# Patient Record
Sex: FEMALE | Race: WHITE | NOT HISPANIC OR LATINO
[De-identification: names, ages, dates, MRNs, and addresses within clinical notes are randomized per-mention and may not be internally consistent; named-entity substitution may affect disease eponyms.]

---

## 2019-02-11 PROBLEM — Z00.00 ENCOUNTER FOR PREVENTIVE HEALTH EXAMINATION: Status: ACTIVE | Noted: 2019-02-11

## 2019-02-22 ENCOUNTER — APPOINTMENT (OUTPATIENT)
Dept: ORTHOPEDIC SURGERY | Facility: CLINIC | Age: 74
End: 2019-02-22
Payer: MEDICARE

## 2019-02-22 VITALS
BODY MASS INDEX: 34.95 KG/M2 | HEART RATE: 82 BPM | WEIGHT: 162 LBS | SYSTOLIC BLOOD PRESSURE: 125 MMHG | HEIGHT: 57 IN | DIASTOLIC BLOOD PRESSURE: 84 MMHG

## 2019-02-22 DIAGNOSIS — Z78.9 OTHER SPECIFIED HEALTH STATUS: ICD-10-CM

## 2019-02-22 DIAGNOSIS — S83.231A COMPLEX TEAR OF MEDIAL MENISCUS, CURRENT INJURY, RIGHT KNEE, INITIAL ENCOUNTER: ICD-10-CM

## 2019-02-22 DIAGNOSIS — M17.0 BILATERAL PRIMARY OSTEOARTHRITIS OF KNEE: ICD-10-CM

## 2019-02-22 PROCEDURE — 99204 OFFICE O/P NEW MOD 45 MIN: CPT

## 2019-02-22 PROCEDURE — 73564 X-RAY EXAM KNEE 4 OR MORE: CPT | Mod: 50

## 2019-02-22 RX ORDER — CYCLOSPORINE 0.5 MG/ML
0.05 EMULSION OPHTHALMIC
Qty: 60 | Refills: 0 | Status: ACTIVE | COMMUNITY
Start: 2019-02-08

## 2019-02-22 RX ORDER — DICLOFENAC SODIUM 20 MG/G
2 SOLUTION TOPICAL
Qty: 1 | Refills: 0 | Status: ACTIVE | COMMUNITY
Start: 2019-02-22 | End: 1900-01-01

## 2019-02-22 NOTE — PHYSICAL EXAM
[de-identified] : Physical Examination\par General: well nourished, in no acute distress, alert and oriented to person, place and time\par Psychiatric: normal mood and affect, no abnormal movements or speech patterns\par Eyes: vision intact - glasses\par Throat: no thyromegaly\par Lymph: no enlarged nodes, no lymphedema in extremity\par Respiratory: no wheezing, no shortness of breath with ambulation\par Cardiac: no cardiac leg swelling, 2+ peripheral pulses\par Neurology: normal gross sensation in extremities to light touch\par Abdomen: soft, non-tender, tympanic, no masses\par \par Musculoskeletal Examination\par Ambulation	+ antalgic gait, - assistive devices\par \par Knee			Right			Left\par General\par      Swelling/Deformity	normal			normal	\par      Skin			normal			normal\par      Erythema		-			-\par      Standing Alignment	varus			varus\par      Effusion		trace			trace\par Range of Motion\par      Hip			full painless ROM		full painless ROM\par      Knee Flexion		100			100\par      Knee Extension	0			0\par Patella\par      J Sign		-			-\par      Quad Medial/Lateral	1/1 1/1\par      Apprehension		-			-\par      Kavon's		+			+\par      Grind Sign		+			+\par      Crepitus		+			+\par Palpation\par      Medial Joint Line	+			+\par      Medial Fem Condyle	-			-\par      Lateral Joint Line	-			-\par      Quad Tendon		-			-\par      Patella Tendon	-			-\par      Medial Patella		-			-\par      Lateral Patella 	-			-\par      Posterior Knee	+			+\par Ligamentous\par      Varus @ 0° / 30°	-/-			-/-\par      Valgus @ 0° / 30°	-/-			-/-\par      Lachman		-			-\par      Pivot Shift		-			-\par      Anterior Drawer	-			-\par      Posterior Drawer	-			-\par Meniscus\par      Colton		=			=\par      Flexion Pinch		-			-\par Strength Examination/Atrophy\par      Hip Flexors 		5+			5+\par      Quadriceps		5+			5+\par      Hamstring		5+			5+\par      Tibialis Anterior	5+			5+\par      Achilles/Soleus	5+			5+\par Sensation\par      Deep Peroneal	normal			normal\par      Superficial Peroneal 	normal			normal\par      Sural  		normal			normal\par      Posterior Tibial 	normal			normal\par      Saphneous 		normal			normal\par Pulses\par      DP			2+			2+\par  [de-identified] : 5 views of the affected bilateral knee (standing AP, flexing standing AP, 30degree flexed lateral, 0degree lateral, sunrise view)\par were ordered, obtained and evaluated by myself today and\par demonstrate:\par \par RIGHT\par There is severe flexed knee asymmetric medial narrowing\par Small osteophytic lipping\par Trace suprapatellar effusion\par I'll to moderate patellofemoral joint space loss without evidence of tilt [or] subluxation on sunrise view\par Normal soft tissue density\par Otherwise normal osseous bone structure without fracture or dislocation\par \par LEFT\par There is severe flexed knee asymmetric medial narrowing\par Small osteophytic lipping\par Trace suprapatellar effusion\par I'll to moderate patellofemoral joint space loss without evidence of tilt [or] subluxation on sunrise view\par Normal soft tissue density\par Otherwise normal osseous bone structure without fracture or dislocation\par \par \par \par MRI of the right knee [facility] dated 11-5-18\par \par My impression of the images:\par Quality of the MRI is ok\par Medial Meniscus complex posterior horn tear with extrusion of the body\par Lateral Meniscus ok\par There is severe chondral loss in the medial compartments\par There is bone marrow edema in the medial compartments\par LCL is intact\par MCL is intact\par ACL is intact\par PCL is intact \par Quadriceps Tendon is intact\par Patella Tendon is intact\par \par The Final Radiologist Impression:\par Ligaments and tendons: The anterior and posterior cruciate ligaments are intact.\par The medial and fibular collateral ligaments are unremarkable.\par The posteromedial and posterolateral corner structures are unremarkable.\par \par Extensor mechanism: Mild distal quadriceps tendinosis and mild insertional patellar tendinosis. The extensor retinacula are unremarkable.\par \par Medial compartment: There is complex tearing of the medial meniscus, with large radial tear at the free margin of the posterior horn and body segments, as well as oblique tearing at the posterior horn/body junction. The remnant body segment is extruded into the medial gutter. \par Diffuse full-thickness cartilage loss along the femorotibial surfaces is associated with large medial osteophytes, as well as subchondral cystic change and bone remodeling towards the medial joint margin.\par \par Lateral compartment: The lateral meniscus is intact. Mild cartilage wear along the lateral femoral condylar surfaces. Small marginal osteophytes.\par \par Patellofemoral compartment: Broad full-thickness cartilage loss along the lateral trochlear surfaces. 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 The tibial tubercle-trochlear groove distance measures approximately 11 mm.\par \par Bone: No fracture or osteonecrosis.\par \par Joint space: Moderate effusion/synovitis. Thin superior and lateral plicae. Small oval 7 x 6 x 3 mm signal hypointensity within the medial aspect of the suprapatellar recess, either an intra-articular body or prominent synovial fold. Minimal popliteal cyst.\par Small, complex cyst tracking along the anteromedial aspect of the medial tibial plateau measures approximately 2.4 x 2.1 x 0.7 cm (craniocaudad, transverse, AP dimensions); it is unclear whether this is reflective of a ganglion cyst or parameniscal cyst related to the above-described medial meniscal tearing.\par \par Neurovascular structures: Unremarkable.\par \par Musculature: Unremarkable.\par \par Subcutaneous tissues: Unremarkable.\par \par IMPRESSION: MRI of the right knee demonstrates:\par \par 1. Complex tearing of the medial meniscus. Complex cyst along the anteromedial aspect of the medial tibial plateau, either a parameniscal cyst or ganglion cyst.\par 2. Tricompartmental arthrosis, worst (severe) in the medial compartment with extensive full-thickness cartilage loss.\par 3. High-grade cartilage loss in the patellofemoral compartment. Mild cartilage wear along the lateral femoral condyle.\par 4. Patella madiha, trochlear dysplasia, and additional findings that can be seen with patellar maltracking.\par 5. Moderate joint effusion/synovitis. Small intra-articular body versus prominent synovial fold within the medial suprapatellar recess.\par 6. Mild distal quadriceps tendinosis and mild insertional patellar tendinosis.\par \par

## 2019-02-22 NOTE — DISCUSSION/SUMMARY
[de-identified] : Bilateral knee osteoarthritis\par Right knee medial meniscus tear on MRI\par \par \par The patient and I discussed the causes and progression of degenerative joint disease of the knee. Models, diagrams and drawings were used in the discussion. Treatment can include conservative non-operative management and surgical options. Conservative management includes weight loss, activity modification, physical therapy to improve motion and strength in the muscles around the knee and the body's core, PO and topical NSAIDs, corticosteroid and/or viscosupplementation intra-articular injections. If the patient fails to improve with non-operative management, surgical management is possible. Depending upon the patient's age, BMI, activity level, degree and location of arthrosis different surgical options are possible including arthroscopic debridement with chondroplasty, high-tibial osteotomy, unicondylar/partial arthroplasty, and total joint arthroplasty.\par Physical therapy was prescribed for knee ROM exercises, strengthening exercises, deep tissue massage, core strengthening, hip abductor strengthening, VMO strengthening, modalities PRN, and home exercises\par \par The patient was prescribed Diclofenac topical liquid/creme non-steroidal anti-inflammatory medication. 1-2 pumps twice daily and apply to area with pain. There is low systemic absorption of the medication but risks while reduced remain were discussed and include but not limited to renal damage and GI ulceration and bleeding. They were warned to stop the medication if worsening buring skin or gastric pain or dizziness or other side effects. Also to immediately stop the medication and seek appriate medical attention if any severe stomach ache, gastritis, black/red vomit, black/red stools or any other medical concern.\par \par Injection was discussed with the patient who declined\par \par Patient is not interested in surgical management at this time we'll continue with nonoperative modalities or treatment only\par \par The patient verifies their understanding the the visit, diagnosis and plan. They agree with the treatment plan and will contact the office with any questions or problems.\par Follow up\par PRN

## 2019-02-22 NOTE — HISTORY OF PRESENT ILLNESS
[de-identified] : CC Bilateral knee\par \par HPI 75 yo female right HD presents with [chronic] onset of long period of constant pain in the entire Bilateral knee [without injury]. The pain is [worse], and rated a 8 out of 10, described as pain, [without radiation]. [Rest] makes the pain better and [walking standing stairs] makes the pain worse. The patient reports associated symptoms of none. The patient - pain at night affecting sleep, and + similar pain previously.\par \par The patient has tried the following treatments:\par Activity modification	-\par Ice/Compression  	-\par Braces    		-\par Nsaids    		-\par Physical Therapy 	-\par Cortisone Injection	-\par Visco Injection		-\par Arthroscopy		-\par \par Review of Systems is positive for the above musculoskeletal symptoms and is otherwise non-contributory for general, constitutional, psychiatric, neurologic, HEENT, cardiac, respiratory, gastrointestinal, reproductive, lymphatic, and dermatologic complaints.\par \par Consult by Dr Ekaterina Junior\par \par

## 2019-07-15 ENCOUNTER — APPOINTMENT (OUTPATIENT)
Dept: UROGYNECOLOGY | Facility: CLINIC | Age: 74
End: 2019-07-15
Payer: MEDICARE

## 2019-07-15 VITALS
SYSTOLIC BLOOD PRESSURE: 131 MMHG | HEIGHT: 60 IN | HEART RATE: 65 BPM | DIASTOLIC BLOOD PRESSURE: 87 MMHG | BODY MASS INDEX: 32.39 KG/M2 | WEIGHT: 165 LBS

## 2019-07-15 DIAGNOSIS — R35.0 FREQUENCY OF MICTURITION: ICD-10-CM

## 2019-07-15 DIAGNOSIS — N39.3 STRESS INCONTINENCE (FEMALE) (MALE): ICD-10-CM

## 2019-07-15 DIAGNOSIS — R32 UNSPECIFIED URINARY INCONTINENCE: ICD-10-CM

## 2019-07-15 DIAGNOSIS — Z80.3 FAMILY HISTORY OF MALIGNANT NEOPLASM OF BREAST: ICD-10-CM

## 2019-07-15 DIAGNOSIS — N39.46 MIXED INCONTINENCE: ICD-10-CM

## 2019-07-15 LAB
BILIRUB UR QL STRIP: NORMAL
CLARITY UR: CLEAR
COLLECTION METHOD: NORMAL
GLUCOSE UR-MCNC: NORMAL
HCG UR QL: 0.2 EU/DL
HGB UR QL STRIP.AUTO: NORMAL
KETONES UR-MCNC: NORMAL
LEUKOCYTE ESTERASE UR QL STRIP: NORMAL
NITRITE UR QL STRIP: NORMAL
PH UR STRIP: 6.5
PROT UR STRIP-MCNC: NORMAL
SP GR UR STRIP: 1

## 2019-07-15 PROCEDURE — 99204 OFFICE O/P NEW MOD 45 MIN: CPT | Mod: 25

## 2019-07-15 PROCEDURE — 81003 URINALYSIS AUTO W/O SCOPE: CPT | Mod: QW

## 2019-07-15 PROCEDURE — 51701 INSERT BLADDER CATHETER: CPT

## 2019-07-15 NOTE — PHYSICAL EXAM
[No Acute Distress] : in no acute distress [Well developed] : well developed [Well Nourished] : ~L well nourished [Oriented x3] : oriented to person, place, and time [Bulbocavernous] : bulbocavernous was present [Anal Reflex] : the anal reflex was present [Tenderness] : ~T no ~M abdominal tenderness observed [Distended] : not distended [H/Smegaly] : no hepatosplenomegaly [Inguinal LAD] : no adenopathy was noted in the inguinal lymph nodes [Warm and Dry] : was warm and dry to touch [Normal Gait] : gait was normal [Vulvar Atrophy] : vulvar atrophy [Normal Appearance] : general appearance was normal [Normal] : normal [Post Void Residual ____ml] : post void residual via catheterization was [unfilled] ml [Normal rectal exam] : was normal

## 2019-07-15 NOTE — PROCEDURE
[FreeTextEntry1] : A catheterized urine was performed to rule out urinary tract infection and/or retention

## 2019-07-15 NOTE — DISCUSSION/SUMMARY
[FreeTextEntry1] : I reviewed the above findings with the patient and her daughter. We discussed the stress incontinence and OR overactive bladder. Treatment options for the stress incontinence were discussed and included doing nothing, behavioral modification, Kegel's exercises, medications, a pessary or intravaginal devices, periurethral bulking, and surgical correction with a suburethral sling v Lopez v autologous sling. For OAB, We discussed first line therapies, including Kegel's exercises and behavioral modification. We discussed medications. We also  discussed second and third line therapies, including the InterStim procedure, Botox, and PTNS. Her urge incontinence is bothering her the most and we discussed starting at the oxybutynin which she was prescribed in the past disease she has improvement. I've also asked her to complete a 3 day urologic. We discussed this does not have improvement in her symptoms on the oxybutynin would likely proceed with urodynamic testing to further evaluate her urinary complaints. I've asked her to complete a 3 day urologic prior to her next visit. I've asked to followup in the office in approximately one month. IUGA patient information on stress incontinence, overactive bladder, and 3 day urologic was given to her. All questions were answered.\par '

## 2019-07-15 NOTE — HISTORY OF PRESENT ILLNESS
[Urge Incontinence Of Urine] : none [Urinary Frequency] : none [Feelings Of Urinary Urgency] : daily [Urinary Tract Infection] : none [x3+] : three or more  times a night [] : years ago [de-identified] : 2-3x in last few months [de-identified] : sometimes with laugh, cough, sneeze [de-identified] : many [de-identified] : sometimes [de-identified] : wears 2 -3 pads/day [de-identified] : every 1-2 hours [de-identified] : not sexualy active [FreeTextEntry1] : ROS as per questionnaire.\par

## 2019-07-16 ENCOUNTER — RESULT REVIEW (OUTPATIENT)
Age: 74
End: 2019-07-16

## 2019-07-16 LAB
APPEARANCE: CLEAR
BACTERIA: NEGATIVE
BILIRUBIN URINE: NEGATIVE
BLOOD URINE: ABNORMAL
COLOR: COLORLESS
GLUCOSE QUALITATIVE U: NEGATIVE
HYALINE CASTS: 0 /LPF
KETONES URINE: NEGATIVE
LEUKOCYTE ESTERASE URINE: NEGATIVE
MICROSCOPIC-UA: NORMAL
NITRITE URINE: NEGATIVE
PH URINE: 6.5
PROTEIN URINE: NEGATIVE
RED BLOOD CELLS URINE: 0 /HPF
SPECIFIC GRAVITY URINE: 1
SQUAMOUS EPITHELIAL CELLS: 1 /HPF
UROBILINOGEN URINE: NORMAL
WHITE BLOOD CELLS URINE: 0 /HPF

## 2019-07-17 ENCOUNTER — RESULT REVIEW (OUTPATIENT)
Age: 74
End: 2019-07-17

## 2019-07-17 LAB — BACTERIA UR CULT: NORMAL

## 2019-08-14 ENCOUNTER — APPOINTMENT (OUTPATIENT)
Dept: UROGYNECOLOGY | Facility: CLINIC | Age: 74
End: 2019-08-14
Payer: MEDICARE

## 2019-08-14 DIAGNOSIS — N32.81 OVERACTIVE BLADDER: ICD-10-CM

## 2019-08-14 PROCEDURE — 99214 OFFICE O/P EST MOD 30 MIN: CPT

## 2019-08-14 NOTE — PHYSICAL EXAM
[No Acute Distress] : in no acute distress [Well Nourished] : ~L well nourished [Well developed] : well developed [Good Hygeine] : demonstrates good hygeine [Oriented x3] : oriented to person, place, and time [Normal Memory] : ~T memory was ~L unimpaired [Normal Mood/Affect] : mood and affect are normal [Warm and Dry] : was warm and dry to touch [Normal Gait] : gait was normal [Labia Majora] : were normal [Labia Minora] : were normal [Normal] : was normal [Normal Appearance] : general appearance was normal [Atrophy] : atrophy [No Bleeding] : there was no active vaginal bleeding [Tenderness] : ~T no ~M abdominal tenderness observed [Anxiety] : patient is not anxious [Distended] : not distended

## 2019-08-14 NOTE — HISTORY OF PRESENT ILLNESS
[FreeTextEntry1] : Pt presents to office for f/u of OAB.  Pt was taking oxybutynin ER  intermittently ( she does not recall strength) for 3 weeks.  She did not notice an improvement in urgency and UUI symptoms.  3 day voiding diary shows voiding frequency every 2-3 hours,  0-1 episodes of UUI, Nocturia 3 times per night.  However pt also drinks 2-3 cups of water throughout the night.  1-2 cups of tea daily.  She drinks 60-70 oz of water daily.

## 2019-08-14 NOTE — DISCUSSION/SUMMARY
[FreeTextEntry1] : I reviewed behavioral modification techniques with pt.  She was advised to drink 40 oz of water daily, 1 cup of tea.  She will stop drinking fluids 2-3 hours prior to sleeping and sip water if needed.  She will try this first. Pt does not prefer to take OAB medications if she does not have to.  I reviewed 3rd line therapies including PTNS, Intradetrusor botox and Sacral neurostimulator.  She declined these options.  Pt will try BMT and medication and RTO in 6-8 weeks or sooner if needed.  All questions answered.

## 2020-06-06 ENCOUNTER — EMERGENCY (EMERGENCY)
Facility: HOSPITAL | Age: 75
LOS: 1 days | Discharge: HOME | End: 2020-06-06
Attending: EMERGENCY MEDICINE
Payer: MEDICARE

## 2020-06-06 VITALS
DIASTOLIC BLOOD PRESSURE: 68 MMHG | RESPIRATION RATE: 20 BRPM | SYSTOLIC BLOOD PRESSURE: 121 MMHG | HEART RATE: 61 BPM | OXYGEN SATURATION: 99 % | TEMPERATURE: 99 F

## 2020-06-06 VITALS
SYSTOLIC BLOOD PRESSURE: 115 MMHG | OXYGEN SATURATION: 96 % | HEART RATE: 60 BPM | DIASTOLIC BLOOD PRESSURE: 78 MMHG | HEIGHT: 59 IN | RESPIRATION RATE: 20 BRPM | WEIGHT: 164.91 LBS | TEMPERATURE: 99 F

## 2020-06-06 LAB
ALBUMIN SERPL ELPH-MCNC: 3.9 G/DL — SIGNIFICANT CHANGE UP (ref 3.3–5)
ALP SERPL-CCNC: 69 U/L — SIGNIFICANT CHANGE UP (ref 40–120)
ALT FLD-CCNC: 23 U/L — SIGNIFICANT CHANGE UP (ref 10–45)
ANION GAP SERPL CALC-SCNC: 10 MMOL/L — SIGNIFICANT CHANGE UP (ref 5–17)
AST SERPL-CCNC: 23 U/L — SIGNIFICANT CHANGE UP (ref 10–40)
BASOPHILS # BLD AUTO: 0.03 K/UL — SIGNIFICANT CHANGE UP (ref 0–0.2)
BASOPHILS NFR BLD AUTO: 0.4 % — SIGNIFICANT CHANGE UP (ref 0–2)
BILIRUB SERPL-MCNC: 0.5 MG/DL — SIGNIFICANT CHANGE UP (ref 0.2–1.2)
BUN SERPL-MCNC: 10 MG/DL — SIGNIFICANT CHANGE UP (ref 7–23)
CALCIUM SERPL-MCNC: 9.3 MG/DL — SIGNIFICANT CHANGE UP (ref 8.4–10.5)
CHLORIDE SERPL-SCNC: 104 MMOL/L — SIGNIFICANT CHANGE UP (ref 96–108)
CO2 SERPL-SCNC: 24 MMOL/L — SIGNIFICANT CHANGE UP (ref 22–31)
CREAT SERPL-MCNC: 0.86 MG/DL — SIGNIFICANT CHANGE UP (ref 0.5–1.3)
EOSINOPHIL # BLD AUTO: 0.14 K/UL — SIGNIFICANT CHANGE UP (ref 0–0.5)
EOSINOPHIL NFR BLD AUTO: 2 % — SIGNIFICANT CHANGE UP (ref 0–6)
GLUCOSE SERPL-MCNC: 115 MG/DL — HIGH (ref 70–99)
HCT VFR BLD CALC: 41 % — SIGNIFICANT CHANGE UP (ref 34.5–45)
HGB BLD-MCNC: 13.3 G/DL — SIGNIFICANT CHANGE UP (ref 11.5–15.5)
IMM GRANULOCYTES NFR BLD AUTO: 0.4 % — SIGNIFICANT CHANGE UP (ref 0–1.5)
LYMPHOCYTES # BLD AUTO: 2.79 K/UL — SIGNIFICANT CHANGE UP (ref 1–3.3)
LYMPHOCYTES # BLD AUTO: 39.8 % — SIGNIFICANT CHANGE UP (ref 13–44)
MCHC RBC-ENTMCNC: 29.2 PG — SIGNIFICANT CHANGE UP (ref 27–34)
MCHC RBC-ENTMCNC: 32.4 GM/DL — SIGNIFICANT CHANGE UP (ref 32–36)
MCV RBC AUTO: 89.9 FL — SIGNIFICANT CHANGE UP (ref 80–100)
MONOCYTES # BLD AUTO: 0.48 K/UL — SIGNIFICANT CHANGE UP (ref 0–0.9)
MONOCYTES NFR BLD AUTO: 6.8 % — SIGNIFICANT CHANGE UP (ref 2–14)
NEUTROPHILS # BLD AUTO: 3.54 K/UL — SIGNIFICANT CHANGE UP (ref 1.8–7.4)
NEUTROPHILS NFR BLD AUTO: 50.6 % — SIGNIFICANT CHANGE UP (ref 43–77)
NRBC # BLD: 0 /100 WBCS — SIGNIFICANT CHANGE UP (ref 0–0)
PLATELET # BLD AUTO: 201 K/UL — SIGNIFICANT CHANGE UP (ref 150–400)
POTASSIUM SERPL-MCNC: 4.3 MMOL/L — SIGNIFICANT CHANGE UP (ref 3.5–5.3)
POTASSIUM SERPL-SCNC: 4.3 MMOL/L — SIGNIFICANT CHANGE UP (ref 3.5–5.3)
PROT SERPL-MCNC: 6.8 G/DL — SIGNIFICANT CHANGE UP (ref 6–8.3)
RBC # BLD: 4.56 M/UL — SIGNIFICANT CHANGE UP (ref 3.8–5.2)
RBC # FLD: 13.5 % — SIGNIFICANT CHANGE UP (ref 10.3–14.5)
SODIUM SERPL-SCNC: 138 MMOL/L — SIGNIFICANT CHANGE UP (ref 135–145)
TROPONIN T, HIGH SENSITIVITY RESULT: 7 NG/L — SIGNIFICANT CHANGE UP (ref 0–51)
TROPONIN T, HIGH SENSITIVITY RESULT: 7 NG/L — SIGNIFICANT CHANGE UP (ref 0–51)
WBC # BLD: 7.01 K/UL — SIGNIFICANT CHANGE UP (ref 3.8–10.5)
WBC # FLD AUTO: 7.01 K/UL — SIGNIFICANT CHANGE UP (ref 3.8–10.5)

## 2020-06-06 PROCEDURE — 93005 ELECTROCARDIOGRAM TRACING: CPT

## 2020-06-06 PROCEDURE — 99284 EMERGENCY DEPT VISIT MOD MDM: CPT | Mod: 25

## 2020-06-06 PROCEDURE — 71045 X-RAY EXAM CHEST 1 VIEW: CPT | Mod: 26

## 2020-06-06 PROCEDURE — 36415 COLL VENOUS BLD VENIPUNCTURE: CPT

## 2020-06-06 PROCEDURE — 84484 ASSAY OF TROPONIN QUANT: CPT

## 2020-06-06 PROCEDURE — 99285 EMERGENCY DEPT VISIT HI MDM: CPT

## 2020-06-06 PROCEDURE — 83880 ASSAY OF NATRIURETIC PEPTIDE: CPT

## 2020-06-06 PROCEDURE — 80053 COMPREHEN METABOLIC PANEL: CPT

## 2020-06-06 PROCEDURE — 93010 ELECTROCARDIOGRAM REPORT: CPT

## 2020-06-06 PROCEDURE — 71045 X-RAY EXAM CHEST 1 VIEW: CPT

## 2020-06-06 PROCEDURE — 85027 COMPLETE CBC AUTOMATED: CPT

## 2020-06-06 NOTE — ED ADULT NURSE NOTE - NSIMPLEMENTINTERV_GEN_ALL_ED
Implemented All Universal Safety Interventions:  Serena to call system. Call bell, personal items and telephone within reach. Instruct patient to call for assistance. Room bathroom lighting operational. Non-slip footwear when patient is off stretcher. Physically safe environment: no spills, clutter or unnecessary equipment. Stretcher in lowest position, wheels locked, appropriate side rails in place.

## 2020-06-06 NOTE — ED PROVIDER NOTE - NSFOLLOWUPCLINICS_GEN_ALL_ED_FT
[facility]  Cardiology  [address] [place] [id number]  Phone: [phone]  Fax:   Follow Up Time:

## 2020-06-06 NOTE — ED ADULT NURSE NOTE - OBJECTIVE STATEMENT
75 y F presents to the ED with intermittent L sided chest pain since last night at 1830, radiating from her L arm to her chest and sometimes to her back. Pt reports that she is SOB with the chest pain. Pt reports that pain last about 10 mins about every hour. EMS gave her 162 of ASA and a nitro spray. On assessment, A&Ox4. Denies lightheadedness, dizziness, headaches, numbness and tingling. Breathing spontaneously and unlabored. Sating 98% on Room air. Denies cough. S1 and S2 heard. No Peripheral edema. Cap refill 2s. No JVD. Peripheral pulses strong and equal bilaterally. On cardiac monitor, NSR. Denies CP, SOB and palpitations. Abdomen soft, nontender, nondistended, negative CVA tenderness, positive bowel sounds in all four quadrants. Pt is continent. Denies n/v/d, dysuria, melena and hematuria. IV placed 20g in LAC and 20g from EMS in R hand. Labs drawn and sent. Pt safety maintained. Call bell within reach. Side rails in upward position. Pt awaiting dispo. 75 y F presents to the ED with intermittent L sided chest pain since last night at 1830, radiating from her L arm to her chest and sometimes to her back. Pt reports that she is SOB with the chest pain. Pt reports that pain last about 10 mins about every hour. EMS gave her 162 of ASA and a nitro spray. On assessment, A&Ox4. Denies lightheadedness, dizziness, headaches, numbness and tingling. Breathing spontaneously and unlabored. Sating 98% on Room air. Denies cough. S1 and S2 heard. generalized Peripheral edema. Cap refill 2s. No JVD. Peripheral pulses strong and equal bilaterally. On cardiac monitor, NSR. Denies CP, SOB and palpitations. Abdomen soft, nontender, nondistended, negative CVA tenderness, positive bowel sounds in all four quadrants. Pt is continent. Denies n/v/d, dysuria, melena and hematuria. IV placed 20g in LAC and 20g from EMS in R hand. Labs drawn and sent. Pt safety maintained. Call bell within reach. Side rails in upward position. Pt awaiting dispo.

## 2020-06-06 NOTE — ED PROVIDER NOTE - OBJECTIVE STATEMENT
76yo female with no known pmh presenting with chest pain.  Patient states pain started yesterday as sharp left arm pain radiating to left side of chest.  Pain has been intermittent 76yo female with no known pmh presenting with chest pain.  Patient states pain started yesterday as sharp left arm pain radiating to left side of chest.  Pain has been intermittent    Attendinyo female presents with chest pain that radiates to the left arm.  pain is intermittent.  no fever.  no cough.  had shortness of breath on the way here. 74yo female with no known pmh presenting with chest pain.  Patient states pain started yesterday as sharp left arm pain radiating to left side of chest.  Pain has been intermittent with several episodes since onset.  Non exertional, several episodes last night while sleeping.  Admits to some sob with pain while with ems.  No fevers, cough, n/v, abd pain, lightheadedness, diaphoresis.  No previous tte, stress, cath.  No hormones, recent surgeries, history pe/dvt.      Attendinyo female presents with chest pain that radiates to the left arm.  pain is intermittent.  no fever.  no cough.  had shortness of breath on the way here.

## 2020-06-06 NOTE — ED PROVIDER NOTE - PATIENT PORTAL LINK FT
You can access the FollowMyHealth Patient Portal offered by [facility] by registering at the following website: [email or web address]. By joining [employer]’s FollowMyHealth portal, you will also be able to view your health information using other applications (apps) compatible with our system.

## 2020-06-06 NOTE — ED PROVIDER NOTE - NSFOLLOWUPINSTRUCTIONS_ED_ALL_ED_FT
Rest, drink plenty of fluids  Advance activity as tolerated  Continue all previously prescribed medications as directed  Follow up with your PMD - bring copies of your results  Return to the ER for worsening chest pain, difficulty breathing, if you pass out, or other new or concerning symptoms   We recommend getting a transthoracic echocardiogram and following up with cardiology - contact information has been provided for you

## 2020-06-06 NOTE — ED PROVIDER NOTE - PHYSICAL EXAMINATION
General appearance: NAD, conversant, afebrile    Neck: Trachea midline; Full range of motion, supple   Pulm: CTA bl, normal respiratory effort and no intercostal retractions, normal work of breathing   CV: RRR, No murmurs, rubs, or gallops   Abdomen: Soft, non-tender, non-distended; no masses or hepatosplenomegaly.   Extremities: 1+ peripheral edema    Skin: Dry, normal temperature, turgor and texture; no rash   Psych: Appropriate affect, cooperative;

## 2020-06-06 NOTE — ED PROVIDER NOTE - CLINICAL SUMMARY MEDICAL DECISION MAKING FREE TEXT BOX
76yo female with no known pmh presenting with chest pain.  Currently no pain.  Concern for acs, will send trop, basics.  EKG nsr with no ischemic changes.  No pe risk factors.  Given extremity edema and no history cardiac workup, will send bnp.  Given history, will await results and likely obs for tte.

## 2020-06-06 NOTE — ED PROVIDER NOTE - PROGRESS NOTE DETAILS
Pito:  Patient's lab work unremarkable.  Repeat trop 7 --> 7.  Patient requesting to go home.  Recommending obs for tte but patient requesting to go home.  At this time, stable for dc.  Recommended outpatient tte and follow up with pmd with return precautions.  Per patient's request, discussed case and need for follow up with daughter.

## 2023-01-24 ENCOUNTER — APPOINTMENT (OUTPATIENT)
Dept: OPHTHALMOLOGY | Facility: CLINIC | Age: 78
End: 2023-01-24
Payer: MEDICARE

## 2023-01-24 ENCOUNTER — NON-APPOINTMENT (OUTPATIENT)
Age: 78
End: 2023-01-24

## 2023-01-24 PROCEDURE — 92201 OPSCPY EXTND RTA DRAW UNI/BI: CPT

## 2023-01-24 PROCEDURE — 92004 COMPRE OPH EXAM NEW PT 1/>: CPT

## 2024-04-04 ENCOUNTER — APPOINTMENT (OUTPATIENT)
Dept: ORTHOPEDIC SURGERY | Facility: CLINIC | Age: 79
End: 2024-04-04
Payer: MEDICARE

## 2024-04-04 VITALS
HEART RATE: 71 BPM | BODY MASS INDEX: 32.77 KG/M2 | HEIGHT: 57.5 IN | SYSTOLIC BLOOD PRESSURE: 113 MMHG | DIASTOLIC BLOOD PRESSURE: 77 MMHG | WEIGHT: 154 LBS

## 2024-04-04 PROCEDURE — 72170 X-RAY EXAM OF PELVIS: CPT | Mod: 59

## 2024-04-04 PROCEDURE — 73564 X-RAY EXAM KNEE 4 OR MORE: CPT | Mod: 50

## 2024-04-04 PROCEDURE — 99204 OFFICE O/P NEW MOD 45 MIN: CPT | Mod: 25

## 2024-04-04 PROCEDURE — 20610 DRAIN/INJ JOINT/BURSA W/O US: CPT | Mod: 50

## 2024-04-04 RX ORDER — LIDOCAINE HYDROCHLORIDE 10 MG/ML
1 INJECTION, SOLUTION INFILTRATION; PERINEURAL
Qty: 0 | Refills: 0 | Status: COMPLETED | OUTPATIENT
Start: 2024-04-04

## 2024-04-04 RX ORDER — METHYLPRED ACET/NACL,ISO-OS/PF 40 MG/ML
40 VIAL (ML) INJECTION
Qty: 0 | Refills: 0 | Status: COMPLETED | OUTPATIENT
Start: 2024-04-04

## 2024-04-04 RX ADMIN — METHYLPREDNISOLONE ACETATE 1 MG/ML: 40 INJECTION, SUSPENSION INTRA-ARTICULAR; INTRALESIONAL; INTRAMUSCULAR; SOFT TISSUE at 00:00

## 2024-04-04 RX ADMIN — LIDOCAINE HYDROCHLORIDE 4 %: 10 INJECTION, SOLUTION EPIDURAL; INFILTRATION; INTRACAUDAL; PERINEURAL at 00:00

## 2024-04-04 NOTE — PROCEDURE
[de-identified] : Injection: Right knee joint. Indication: Osteoarthritis.   A discussion was had with the patient regarding this procedure and all questions were answered. All risks, benefits and alternatives were discussed. These included but were not limited to bleeding, infection, allergic reaction and reaccumulation of fluid. A timeout was done to ensure correct side and patient agreed to the procedure.  A [place] yuliya was created on the skin utilizing a plastic needle cap to yuliya the anticipated point of entry. Alcohol was used to clean the skin, and chloraprep was used to sterilize and prep the area in the lateral joint line aspect of the knee. Ethyl chloride spray was then used as a topical anesthetic. A 22-gauge needle was used to inject 4cc 1% lidocaine without epinephrine  and 1cc of 40mg/ml methylprednisolone into the knee. A sterile bandage was then applied. The patient tolerated the procedure well.   Injection: Left knee joint. Indication: Osteoarthritis.   A discussion was had with the patient regarding this procedure and all questions were answered. All risks, benefits and alternatives were discussed. These included but were not limited to bleeding, infection, allergic reaction and reaccumulation of fluid. A timeout was done to ensure correct side and patient agreed to the procedure.  A [place] yuliya was created on the skin utilizing a plastic needle cap to yuliya the anticipated point of entry. Alcohol was used to clean the skin, and chloraprep was used to sterilize and prep the area in the lateral joint line aspect of the knee. Ethyl chloride spray was then used as a topical anesthetic. A 22-gauge needle was used to inject 4cc 1% lidocaine without epinephrine,  and 1cc of 40mg/ml methylprednisolone into the knee. A sterile bandage was then applied. The patient tolerated the procedure well.

## 2024-04-04 NOTE — HISTORY OF PRESENT ILLNESS
[de-identified] : TWIN RUFFIN  is an 79 year-old female presents today with a chief complaint of bilateral right greater than left knee pain. Patient describes onset over the last number of years, but it may be going on for longer.   Patient points to the medial aspect of knee as maximum point of tenderness. Pain is typically 10/10 in severity, dull and achy but sometimes sharp in quality with certain activities. Symptoms are exacerbated by activity, or even walking/standing. They are improved with rest, and ice. Patient denies any radiation of symptoms beyond the surrounding area. Hip and ankle appear to be largely unrelated.   To address the pathology, they have tried OTC medications/NSAIDs with some relief, even though short lasting. Injections have not been attempted. Exercise therapy has had only temporary relief but has helped maintain greater than 50 % range of motion. Things have gotten bad enough that patient will need assistive devices like the banister for going up and down stairs. They may need a cane.   At this point the patient is quite frustrated by their condition. As duration of symptoms exceeds months, they are here for further evaluation and discussion of possible diagnoses and management. They deny any further trauma. No fever or chills, no signs of infection. Today, patient does not state any other associated signs or complains outside of those described.   A complete review of symptoms as well as past medical/surgical history, medications, allergies, social and family history, and other details of HPI and exam were reviewed per first visit intake form and updated accordingly. Additional and more relevant details are noted in further detail today.

## 2024-04-04 NOTE — PHYSICAL EXAM
[de-identified] : General Appearance / Station: Well developed, well nourished, in no acute distress  Orientation: Oriented to person, place, and time Gait & Station: Ambulates without assistive device Neurologic: Normal leg sensation  Cardiovascular: Warm extremity  Lymphatics: No lymphedema  Generalized Ligament Laxity: Normal  Stiffness: Normal   RIGHT HIP: Range of motion: Painless  internal and external rotation of the hip. Strength: Within Normal Limits  Palpation: Nontender  at greater trochanter. Nontender  at SI joint Stinchfield: Negative  FADIR: Negative  LUIS MANUEL: Negative   SYMPTOMATIC RIGHT KNEE: Alignment: VARUS Skin: normal Effusion: none . Quadriceps: normal . Range of motion: symmetric but painful . PF crepitus: 1+. PF apprehension: none . Patella / Patella Tendon: nontender . Lachman's: negative  Valgus @ 30: negative. Varus @ 30: negative. Posterior drawer: negative. Palpation: TENDER AT medial and lateral Meniscus signs: MEDIAL JOINT LINE  LEFT HIP: Range of motion: Painless  internal and external rotation of the hip. Strength: Within Normal Limits  Palpation: Nontender  at greater trochanter. Nontender  at SI joint Stinchfield: Negative  FADIR: Negative  LUIS MANUEL: Negative  SYMPTOMATIC LEFT KNEE: Alignment: VARUS Skin: normal Effusion: none . Quadriceps: normal . Range of motion: symmetric but painful . PF crepitus: 1+. PF apprehension: none . Patella / Patella Tendon: nontender . Lachman's: negative  Valgus @ 30: negative. Varus @ 30: negative. Posterior drawer: negative. Palpation: TENDER AT medial lateral Meniscus signs: MEDIAL JOINT LINE  [de-identified] : Imaging: Standing 4 views of the right knee show right knee severe arthritis worse in the medial compartment with loss of joint space, subchondral sclerosis, marginal osteophyte formations, and subchondral cyst formation. There are no signs of fracture. There is no  knee effusion. The soft tissues appear unremarkable.  Imaging: Standing 4 views of the left knee show left knee severe arthritis worse in the medial compartment with loss of joint space, subchondral sclerosis, marginal osteophyte formations, and subchondral cyst formation. There are no signs of fracture. There is no  knee effusion. The soft tissues appear unremarkable.  Imaging: AP Pelvis show no significant joint space narrowing of either hip.  . There are no signs of fracture. The soft tissues appear unremarkable

## 2024-04-04 NOTE — DISCUSSION/SUMMARY
[de-identified] : I discussed nonoperative treatment options for their bilateral knee arthritis. We discussed nonoperative treatments options including activity modification, therapy, bracing, nonsteroidal anti-inflammatory medications, and injections. The patient would like to continue with nonoperative treatment and would like to proceed with activity modification and weight loss,, steroid injection   I discussed with them that I often prescribe an anti-inflammatory that should be taken once a day with meals to decrease pain and expedite symptom relief. They should not take this while also taking Aleve (Naprosyn), Motrin/ Advil (Ibuprofen), Toradol (ketoralac). They must stop taking it if they develops stomach pain, increased bleeding or bruising and they should follow-up with their primary care doctor for routine blood work including kidney function to monitor its effect. While it Is not a habit-forming substance, it should only  be taken as needed and to discontinue use once symptoms have resolved.  They prefer to continue with over-the-counter medication.  If they have sufficient relief with steroid injection, I like to attempt a more long-acting medication which I will order today.  We discussed that when nonoperative treatment is no longer successful and their pain is severe enough that it is affecting their ability to perform activities of daily living, a joint replacement may help them.  My cumulative time spent on this patients visit included: Preparation for the visit, review of the medical records, review of pertinent diagnostic studies, examination and counseling of the patient on the above diagnosis, treatment plan and prognosis, orders of diagnostic tests, medications and/or appropriate procedures and documentation in the medical records of todays visit.

## 2024-07-18 ENCOUNTER — APPOINTMENT (OUTPATIENT)
Dept: ORTHOPEDIC SURGERY | Facility: CLINIC | Age: 79
End: 2024-07-18
Payer: MEDICARE

## 2024-07-18 DIAGNOSIS — M17.11 UNILATERAL PRIMARY OSTEOARTHRITIS, RIGHT KNEE: ICD-10-CM

## 2024-07-18 PROCEDURE — 99215 OFFICE O/P EST HI 40 MIN: CPT

## 2024-08-05 ENCOUNTER — APPOINTMENT (OUTPATIENT)
Dept: CT IMAGING | Facility: CLINIC | Age: 79
End: 2024-08-05

## 2024-08-05 ENCOUNTER — OUTPATIENT (OUTPATIENT)
Dept: OUTPATIENT SERVICES | Facility: HOSPITAL | Age: 79
LOS: 1 days | End: 2024-08-05
Payer: MEDICARE

## 2024-08-05 DIAGNOSIS — M17.11 UNILATERAL PRIMARY OSTEOARTHRITIS, RIGHT KNEE: ICD-10-CM

## 2024-08-05 PROCEDURE — 73700 CT LOWER EXTREMITY W/O DYE: CPT | Mod: 26,RT

## 2024-08-05 PROCEDURE — 73700 CT LOWER EXTREMITY W/O DYE: CPT

## 2024-08-14 ENCOUNTER — NON-APPOINTMENT (OUTPATIENT)
Age: 79
End: 2024-08-14

## 2024-08-15 ENCOUNTER — NON-APPOINTMENT (OUTPATIENT)
Age: 79
End: 2024-08-15

## 2024-08-20 ENCOUNTER — NON-APPOINTMENT (OUTPATIENT)
Age: 79
End: 2024-08-20

## 2024-08-23 ENCOUNTER — OUTPATIENT (OUTPATIENT)
Dept: OUTPATIENT SERVICES | Facility: HOSPITAL | Age: 79
LOS: 1 days | End: 2024-08-23
Payer: MEDICARE

## 2024-08-23 VITALS
RESPIRATION RATE: 18 BRPM | HEIGHT: 58 IN | HEART RATE: 60 BPM | SYSTOLIC BLOOD PRESSURE: 124 MMHG | WEIGHT: 149.91 LBS | DIASTOLIC BLOOD PRESSURE: 78 MMHG | OXYGEN SATURATION: 98 % | TEMPERATURE: 98 F

## 2024-08-23 DIAGNOSIS — Z01.818 ENCOUNTER FOR OTHER PREPROCEDURAL EXAMINATION: ICD-10-CM

## 2024-08-23 DIAGNOSIS — M17.11 UNILATERAL PRIMARY OSTEOARTHRITIS, RIGHT KNEE: ICD-10-CM

## 2024-08-23 LAB
ALBUMIN SERPL ELPH-MCNC: 3.8 G/DL — SIGNIFICANT CHANGE UP (ref 3.3–5)
ALP SERPL-CCNC: 77 U/L — SIGNIFICANT CHANGE UP (ref 30–120)
ALT FLD-CCNC: 21 U/L — SIGNIFICANT CHANGE UP (ref 10–60)
ANION GAP SERPL CALC-SCNC: 6 MMOL/L — SIGNIFICANT CHANGE UP (ref 5–17)
APTT BLD: 31.4 SEC — SIGNIFICANT CHANGE UP (ref 24.5–35.6)
AST SERPL-CCNC: 18 U/L — SIGNIFICANT CHANGE UP (ref 10–40)
BILIRUB SERPL-MCNC: 0.8 MG/DL — SIGNIFICANT CHANGE UP (ref 0.2–1.2)
BLD GP AB SCN SERPL QL: SIGNIFICANT CHANGE UP
BUN SERPL-MCNC: 12 MG/DL — SIGNIFICANT CHANGE UP (ref 7–23)
CALCIUM SERPL-MCNC: 9.5 MG/DL — SIGNIFICANT CHANGE UP (ref 8.4–10.5)
CHLORIDE SERPL-SCNC: 104 MMOL/L — SIGNIFICANT CHANGE UP (ref 96–108)
CO2 SERPL-SCNC: 31 MMOL/L — SIGNIFICANT CHANGE UP (ref 22–31)
CREAT SERPL-MCNC: 0.85 MG/DL — SIGNIFICANT CHANGE UP (ref 0.5–1.3)
EGFR: 69 ML/MIN/1.73M2 — SIGNIFICANT CHANGE UP
GLUCOSE SERPL-MCNC: 108 MG/DL — HIGH (ref 70–99)
HCT VFR BLD CALC: 42.2 % — SIGNIFICANT CHANGE UP (ref 34.5–45)
HGB BLD-MCNC: 13.8 G/DL — SIGNIFICANT CHANGE UP (ref 11.5–15.5)
INR BLD: 1 RATIO — SIGNIFICANT CHANGE UP (ref 0.85–1.18)
MCHC RBC-ENTMCNC: 29.2 PG — SIGNIFICANT CHANGE UP (ref 27–34)
MCHC RBC-ENTMCNC: 32.7 GM/DL — SIGNIFICANT CHANGE UP (ref 32–36)
MCV RBC AUTO: 89.2 FL — SIGNIFICANT CHANGE UP (ref 80–100)
NRBC # BLD: 0 /100 WBCS — SIGNIFICANT CHANGE UP (ref 0–0)
PLATELET # BLD AUTO: 201 K/UL — SIGNIFICANT CHANGE UP (ref 150–400)
POTASSIUM SERPL-MCNC: 4.4 MMOL/L — SIGNIFICANT CHANGE UP (ref 3.5–5.3)
POTASSIUM SERPL-SCNC: 4.4 MMOL/L — SIGNIFICANT CHANGE UP (ref 3.5–5.3)
PROT SERPL-MCNC: 7.3 G/DL — SIGNIFICANT CHANGE UP (ref 6–8.3)
PROTHROM AB SERPL-ACNC: 11.2 SEC — SIGNIFICANT CHANGE UP (ref 9.5–13)
RBC # BLD: 4.73 M/UL — SIGNIFICANT CHANGE UP (ref 3.8–5.2)
RBC # FLD: 13.2 % — SIGNIFICANT CHANGE UP (ref 10.3–14.5)
SODIUM SERPL-SCNC: 141 MMOL/L — SIGNIFICANT CHANGE UP (ref 135–145)
WBC # BLD: 7.09 K/UL — SIGNIFICANT CHANGE UP (ref 3.8–10.5)
WBC # FLD AUTO: 7.09 K/UL — SIGNIFICANT CHANGE UP (ref 3.8–10.5)

## 2024-08-23 PROCEDURE — 86900 BLOOD TYPING SEROLOGIC ABO: CPT

## 2024-08-23 PROCEDURE — G0463: CPT

## 2024-08-23 PROCEDURE — 87641 MR-STAPH DNA AMP PROBE: CPT

## 2024-08-23 PROCEDURE — 86901 BLOOD TYPING SEROLOGIC RH(D): CPT

## 2024-08-23 PROCEDURE — 93010 ELECTROCARDIOGRAM REPORT: CPT

## 2024-08-23 PROCEDURE — 85027 COMPLETE CBC AUTOMATED: CPT

## 2024-08-23 PROCEDURE — 85730 THROMBOPLASTIN TIME PARTIAL: CPT

## 2024-08-23 PROCEDURE — 36415 COLL VENOUS BLD VENIPUNCTURE: CPT

## 2024-08-23 PROCEDURE — 83036 HEMOGLOBIN GLYCOSYLATED A1C: CPT

## 2024-08-23 PROCEDURE — 87640 STAPH A DNA AMP PROBE: CPT

## 2024-08-23 PROCEDURE — 93005 ELECTROCARDIOGRAM TRACING: CPT

## 2024-08-23 PROCEDURE — 85610 PROTHROMBIN TIME: CPT

## 2024-08-23 PROCEDURE — 86850 RBC ANTIBODY SCREEN: CPT

## 2024-08-23 PROCEDURE — 80053 COMPREHEN METABOLIC PANEL: CPT

## 2024-08-23 NOTE — H&P PST ADULT - NSICDXPASTMEDICALHX_GEN_ALL_CORE_FT
PAST MEDICAL HISTORY:  Hypercholesterolemia     Osteoarthritis of right knee     Overactive bladder

## 2024-08-23 NOTE — H&P PST ADULT - PROBLEM SELECTOR PLAN 1
78 y/o female with Right knee pain  scheduled surgery: Osteoarthritis of right knee  will obtain medical clearance  Patient provided with pre-operative instructions and verbalized understanding.    Patient will be NPO on day of surgery.   Patient will stop NSAIDs, aspirin, herbal supplements or vitamins 1 week prior to surgery.    Chlorhexidine wash  and Gatorade provided with instructions.

## 2024-08-23 NOTE — H&P PST ADULT - ATTENDING COMMENTS
The discussion regarding options for nonoperative and operative management was introduced through a patient shared decision making protocol. The benefits of surgery versus the risks were discussed with the patient/ family.  Risks of surgery include medical complications of anesthesia, DVT, pulmonary embolism, heart attack, stroke, death, hardware complications, need for revision surgery, infection, bleeding, need for transfusion, neurovascular injury, fracture, chronic pain, stiffness and scarring, tendon injury were addressed with the patient. The patient appeared to understand the ramifications of surgery and had ample time for questions, then consenting for a right total knee replacement.

## 2024-08-23 NOTE — H&P PST ADULT - HISTORY OF PRESENT ILLNESS
78 y/o female present with Right knee pain. Patient reports  the pain is progressively getting worse  for few years . Pain is typically 10/10 in severity, dull and achy but sometimes sharp in quality with certain activities. Symptoms are exacerbated by activity, or even walking/standing. Pain improved with rest, and ice. Patient denies any radiation of symptoms. Patient  is scheduled for Right total knee replacement on 09/04/2024

## 2024-08-24 LAB
A1C WITH ESTIMATED AVERAGE GLUCOSE RESULT: 5.7 % — HIGH (ref 4–5.6)
ESTIMATED AVERAGE GLUCOSE: 117 MG/DL — HIGH (ref 68–114)
MRSA PCR RESULT.: SIGNIFICANT CHANGE UP
S AUREUS DNA NOSE QL NAA+PROBE: SIGNIFICANT CHANGE UP

## 2024-08-29 PROBLEM — N32.81 OVERACTIVE BLADDER: Chronic | Status: ACTIVE | Noted: 2024-08-23

## 2024-08-29 PROBLEM — E78.00 PURE HYPERCHOLESTEROLEMIA, UNSPECIFIED: Chronic | Status: ACTIVE | Noted: 2024-08-23

## 2024-08-29 PROBLEM — M17.11 UNILATERAL PRIMARY OSTEOARTHRITIS, RIGHT KNEE: Chronic | Status: ACTIVE | Noted: 2024-08-23

## 2024-09-04 ENCOUNTER — INPATIENT (INPATIENT)
Facility: HOSPITAL | Age: 79
LOS: 1 days | Discharge: HOME | DRG: 554 | End: 2024-09-06
Attending: STUDENT IN AN ORGANIZED HEALTH CARE EDUCATION/TRAINING PROGRAM | Admitting: STUDENT IN AN ORGANIZED HEALTH CARE EDUCATION/TRAINING PROGRAM
Payer: MEDICARE

## 2024-09-04 ENCOUNTER — TRANSCRIPTION ENCOUNTER (OUTPATIENT)
Age: 79
End: 2024-09-04

## 2024-09-04 ENCOUNTER — APPOINTMENT (OUTPATIENT)
Dept: ORTHOPEDIC SURGERY | Facility: HOSPITAL | Age: 79
End: 2024-09-04

## 2024-09-04 VITALS
WEIGHT: 152.78 LBS | SYSTOLIC BLOOD PRESSURE: 119 MMHG | OXYGEN SATURATION: 98 % | DIASTOLIC BLOOD PRESSURE: 69 MMHG | RESPIRATION RATE: 18 BRPM | TEMPERATURE: 98 F | HEART RATE: 65 BPM | HEIGHT: 58 IN

## 2024-09-04 DIAGNOSIS — M17.11 UNILATERAL PRIMARY OSTEOARTHRITIS, RIGHT KNEE: ICD-10-CM

## 2024-09-04 LAB — ABO RH CONFIRMATION: SIGNIFICANT CHANGE UP

## 2024-09-04 PROCEDURE — 0055T BONE SRGRY CMPTR CT/MRI IMAG: CPT

## 2024-09-04 PROCEDURE — 27447 TOTAL KNEE ARTHROPLASTY: CPT | Mod: RT

## 2024-09-04 PROCEDURE — 99222 1ST HOSP IP/OBS MODERATE 55: CPT

## 2024-09-04 PROCEDURE — 73560 X-RAY EXAM OF KNEE 1 OR 2: CPT | Mod: 26,RT

## 2024-09-04 PROCEDURE — 27447 TOTAL KNEE ARTHROPLASTY: CPT | Mod: AS,RT

## 2024-09-04 DEVICE — BONE WAX 2.5GM: Type: IMPLANTABLE DEVICE | Status: FUNCTIONAL

## 2024-09-04 DEVICE — COMP FEM TRIATHLON CR SZ 3 RT: Type: IMPLANTABLE DEVICE | Status: FUNCTIONAL

## 2024-09-04 DEVICE — MAKO BONE PIN 3.2MM X 110MM: Type: IMPLANTABLE DEVICE | Status: FUNCTIONAL

## 2024-09-04 DEVICE — MAKO BONE PIN 3.2MM X 140MM: Type: IMPLANTABLE DEVICE | Status: FUNCTIONAL

## 2024-09-04 DEVICE — BASEPLATE TIB UNIV TRIATHLON SZ 3: Type: IMPLANTABLE DEVICE | Status: FUNCTIONAL

## 2024-09-04 DEVICE — INSERT TIB BEARING CS X3 SZ 3 12MM: Type: IMPLANTABLE DEVICE | Status: FUNCTIONAL

## 2024-09-04 DEVICE — IMP PATELLA SYMMETRIC X3 31X9MM: Type: IMPLANTABLE DEVICE | Status: FUNCTIONAL

## 2024-09-04 DEVICE — IMPLANTABLE DEVICE: Type: IMPLANTABLE DEVICE | Status: FUNCTIONAL

## 2024-09-04 RX ORDER — SODIUM CHLORIDE 9 MG/ML
500 INJECTION INTRAMUSCULAR; INTRAVENOUS; SUBCUTANEOUS ONCE
Refills: 0 | Status: DISCONTINUED | OUTPATIENT
Start: 2024-09-04 | End: 2024-09-06

## 2024-09-04 RX ORDER — HYDROMORPHONE HYDROCHLORIDE 2 MG/1
0.2 TABLET ORAL
Refills: 0 | Status: DISCONTINUED | OUTPATIENT
Start: 2024-09-04 | End: 2024-09-04

## 2024-09-04 RX ORDER — SODIUM CHLORIDE 9 MG/ML
500 INJECTION INTRAMUSCULAR; INTRAVENOUS; SUBCUTANEOUS ONCE
Refills: 0 | Status: COMPLETED | OUTPATIENT
Start: 2024-09-04 | End: 2024-09-04

## 2024-09-04 RX ORDER — OXYCODONE HYDROCHLORIDE 5 MG/1
10 TABLET ORAL
Refills: 0 | Status: DISCONTINUED | OUTPATIENT
Start: 2024-09-04 | End: 2024-09-06

## 2024-09-04 RX ORDER — CELECOXIB 400 MG/1
200 CAPSULE ORAL EVERY 12 HOURS
Refills: 0 | Status: DISCONTINUED | OUTPATIENT
Start: 2024-09-04 | End: 2024-09-06

## 2024-09-04 RX ORDER — ACETAMINOPHEN 325 MG/1
1000 TABLET ORAL ONCE
Refills: 0 | Status: COMPLETED | OUTPATIENT
Start: 2024-09-04 | End: 2024-09-04

## 2024-09-04 RX ORDER — DEXAMETHASONE 0.75 MG
8 TABLET ORAL ONCE
Refills: 0 | Status: COMPLETED | OUTPATIENT
Start: 2024-09-05 | End: 2024-09-05

## 2024-09-04 RX ORDER — ONDANSETRON 2 MG/ML
4 INJECTION, SOLUTION INTRAMUSCULAR; INTRAVENOUS ONCE
Refills: 0 | Status: DISCONTINUED | OUTPATIENT
Start: 2024-09-04 | End: 2024-09-04

## 2024-09-04 RX ORDER — ASPIRIN 81 MG
81 TABLET, DELAYED RELEASE (ENTERIC COATED) ORAL EVERY 12 HOURS
Refills: 0 | Status: DISCONTINUED | OUTPATIENT
Start: 2024-09-05 | End: 2024-09-06

## 2024-09-04 RX ORDER — POLYETHYLENE GLYCOL 3350 17 G/17G
17 POWDER, FOR SOLUTION ORAL AT BEDTIME
Refills: 0 | Status: DISCONTINUED | OUTPATIENT
Start: 2024-09-04 | End: 2024-09-06

## 2024-09-04 RX ORDER — OXYCODONE HYDROCHLORIDE 5 MG/1
5 TABLET ORAL
Refills: 0 | Status: DISCONTINUED | OUTPATIENT
Start: 2024-09-04 | End: 2024-09-06

## 2024-09-04 RX ORDER — CHLORHEXIDINE GLUCONATE 40 MG/ML
1 SOLUTION TOPICAL ONCE
Refills: 0 | Status: COMPLETED | OUTPATIENT
Start: 2024-09-04 | End: 2024-09-04

## 2024-09-04 RX ORDER — HYDROMORPHONE HYDROCHLORIDE 2 MG/1
0.5 TABLET ORAL
Refills: 0 | Status: DISCONTINUED | OUTPATIENT
Start: 2024-09-04 | End: 2024-09-06

## 2024-09-04 RX ORDER — PANTOPRAZOLE SODIUM 40 MG
40 TABLET, DELAYED RELEASE (ENTERIC COATED) ORAL
Refills: 0 | Status: DISCONTINUED | OUTPATIENT
Start: 2024-09-04 | End: 2024-09-06

## 2024-09-04 RX ORDER — ONDANSETRON 2 MG/ML
4 INJECTION, SOLUTION INTRAMUSCULAR; INTRAVENOUS EVERY 6 HOURS
Refills: 0 | Status: DISCONTINUED | OUTPATIENT
Start: 2024-09-04 | End: 2024-09-06

## 2024-09-04 RX ORDER — OXYCODONE HYDROCHLORIDE 5 MG/1
5 TABLET ORAL ONCE
Refills: 0 | Status: DISCONTINUED | OUTPATIENT
Start: 2024-09-04 | End: 2024-09-04

## 2024-09-04 RX ORDER — HYDROMORPHONE HYDROCHLORIDE 2 MG/1
0.5 TABLET ORAL
Refills: 0 | Status: COMPLETED | OUTPATIENT
Start: 2024-09-04 | End: 2024-09-11

## 2024-09-04 RX ORDER — HYDROMORPHONE HYDROCHLORIDE 2 MG/1
0.5 TABLET ORAL
Refills: 0 | Status: DISCONTINUED | OUTPATIENT
Start: 2024-09-04 | End: 2024-09-04

## 2024-09-04 RX ORDER — ACETAMINOPHEN 325 MG/1
1000 TABLET ORAL EVERY 8 HOURS
Refills: 0 | Status: DISCONTINUED | OUTPATIENT
Start: 2024-09-05 | End: 2024-09-06

## 2024-09-04 RX ORDER — APREPITANT 40 MG/1
40 CAPSULE ORAL ONCE
Refills: 0 | Status: COMPLETED | OUTPATIENT
Start: 2024-09-04 | End: 2024-09-04

## 2024-09-04 RX ORDER — SENNA 187 MG
2 TABLET ORAL AT BEDTIME
Refills: 0 | Status: DISCONTINUED | OUTPATIENT
Start: 2024-09-04 | End: 2024-09-06

## 2024-09-04 RX ORDER — CEFAZOLIN SODIUM 2 G/100ML
2000 INJECTION, SOLUTION INTRAVENOUS EVERY 8 HOURS
Refills: 0 | Status: COMPLETED | OUTPATIENT
Start: 2024-09-04 | End: 2024-09-05

## 2024-09-04 RX ORDER — CEFAZOLIN SODIUM 2 G/100ML
2000 INJECTION, SOLUTION INTRAVENOUS ONCE
Refills: 0 | Status: COMPLETED | OUTPATIENT
Start: 2024-09-04 | End: 2024-09-04

## 2024-09-04 RX ADMIN — Medication 100 MILLILITER(S): at 16:35

## 2024-09-04 RX ADMIN — APREPITANT 40 MILLIGRAM(S): 40 CAPSULE ORAL at 07:02

## 2024-09-04 RX ADMIN — Medication 10 MILLIGRAM(S): at 21:19

## 2024-09-04 RX ADMIN — CEFAZOLIN SODIUM 100 MILLIGRAM(S): 2 INJECTION, SOLUTION INTRAVENOUS at 16:35

## 2024-09-04 RX ADMIN — ACETAMINOPHEN 400 MILLIGRAM(S): 325 TABLET ORAL at 23:14

## 2024-09-04 RX ADMIN — SODIUM CHLORIDE 500 MILLILITER(S): 9 INJECTION INTRAMUSCULAR; INTRAVENOUS; SUBCUTANEOUS at 18:49

## 2024-09-04 RX ADMIN — Medication 100 MILLILITER(S): at 21:18

## 2024-09-04 RX ADMIN — CHLORHEXIDINE GLUCONATE 1 APPLICATION(S): 40 SOLUTION TOPICAL at 07:33

## 2024-09-04 RX ADMIN — CELECOXIB 200 MILLIGRAM(S): 400 CAPSULE ORAL at 21:20

## 2024-09-04 RX ADMIN — CELECOXIB 200 MILLIGRAM(S): 400 CAPSULE ORAL at 21:25

## 2024-09-04 RX ADMIN — Medication 2 TABLET(S): at 21:20

## 2024-09-04 RX ADMIN — ACETAMINOPHEN 400 MILLIGRAM(S): 325 TABLET ORAL at 18:10

## 2024-09-04 RX ADMIN — ACETAMINOPHEN 1000 MILLIGRAM(S): 325 TABLET ORAL at 18:18

## 2024-09-04 NOTE — PATIENT PROFILE ADULT - FALL HARM RISK - UNIVERSAL INTERVENTIONS
Bed in lowest position, wheels locked, appropriate side rails in place/Call bell, personal items and telephone in reach/Instruct patient to call for assistance before getting out of bed or chair/Non-slip footwear when patient is out of bed/[place] to call system/Physically safe environment - no spills, clutter or unnecessary equipment/Purposeful Proactive Rounding/Room/bathroom lighting operational, light cord in reach

## 2024-09-04 NOTE — DISCHARGE NOTE PROVIDER - HOSPITAL COURSE
This patient was admitted to [facility] with a history of severe degenerative joint disease of the right knee.  Patient underwent Pre-Surgical Testing and was medically cleared to undergo elective procedure. Patient underwent Right Total Knee Replacement by Dr. Page. Procedure was well tolerated.  No operative or corinna-operative complications arose during patient's hospital course.  Patient received antibiotic according to SCIP guidelines for infection prevention.  Aspirin was given for DVT prophylaxis, in addition to the use of SCDs.  Anesthesia, Medical Hospitalist, Physical Therapy and Occupational Therapy were consulted. Patient is stable for discharge with a good prognosis.  Appropriate discharge instructions and medications are provided in this document.  Patient is going home with home care (PT/OT/Skilled Nursing). This patient was admitted to [facility] with a history of severe degenerative joint disease of the right knee.  Patient underwent Pre-Surgical Testing and was medically cleared to undergo elective procedure. Patient underwent Right Total Knee Replacement by Dr. Page on 9/4/24. Procedure was well tolerated.  No operative or corinna-operative complications arose during patient's hospital course.  Patient received antibiotic according to SCIP guidelines for infection prevention.  Aspirin was given for DVT prophylaxis, in addition to the use of SCDs.  Anesthesia, Medical Hospitalist, Physical Therapy and Occupational Therapy were consulted. Patient is stable for discharge with a good prognosis.  Appropriate discharge instructions and medications are provided in this document. Patient is going home with home PT

## 2024-09-04 NOTE — DISCHARGE NOTE PROVIDER - PROVIDER TOKENS
PROVIDER:[TOKEN:[[id number]:[id number]]] PROVIDER:[TOKEN:[[id number]],SCHEDULEDAPPT:[09/19/2024],SCHEDULEDAPPTTIME:[01:00 PM],ESTABLISHEDPATIENT:[T]]

## 2024-09-04 NOTE — CARE COORDINATION ASSESSMENT. - OTHER PERTINENT DISCHARGE PLANNING INFORMATION:
Met patient at bedside.  Explained role of CM, verbalized understanding. Pt was made aware a CM will remain available through hospitalization.  Contact information given in discharge/ transitions resource folder. Patient lives in a private home with her daughter. There are 3 steps to enter and zero steps once inside. Patient has a rolling walker, commode, and cane at home. She has no history of formal homecare services/aide services. Patient/daughter requesting [facility], referral initiated and will be sent prior to discharge. Patient is utilizing [facility] upon discharge. Patient's daughter will transport her home.

## 2024-09-04 NOTE — PRE-OP CHECKLIST - HIBICLENS SHOWER 1 DATE
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 Crook [place])    HTN (hypertension)     MI (myocardial infarction) 7 coronary stents    Status post CVA residual rt. lower ext weakness.    Stented coronary artery     Thrombosis apical    
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 Crook [place])    HTN (hypertension)     MI (myocardial infarction) 7 coronary stents    Status post CVA residual rt. lower ext weakness.    Stented coronary artery     Thrombosis apical    
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 Crook [place])    HTN (hypertension)     MI (myocardial infarction) 7 coronary stents    Status post CVA residual rt. lower ext weakness.    Stented coronary artery     Thrombosis apical    
01-Sep-2024

## 2024-09-04 NOTE — PHYSICAL THERAPY INITIAL EVALUATION ADULT - ADDITIONAL COMMENTS
Pt lives with daughter and family in a house with 3 [address] +HR, on first floor inside. Pt reports owning a rolling walker and commode but was independent without use of any ADs at home. Pt's daughter will be home to assist pt as needed once home.

## 2024-09-04 NOTE — PHYSICAL THERAPY INITIAL EVALUATION ADULT - RANGE OF MOTION EXAMINATION, REHAB EVAL
0-70 degrees flexion/bilateral upper extremity ROM was WFL (within functional limits)/bilateral lower extremity ROM was WFL (within functional limits)

## 2024-09-04 NOTE — DISCHARGE NOTE NURSING/CASE MANAGEMENT/SOCIAL WORK - NSDCVIVACCINE_GEN_ALL_CORE_FT
Tdap; 15-Nov-2015 11:58; Ladi Del Valle ([place]); [employer]; [id number]; IntraMuscular; Deltoid Right.; 0.5 milliLiter(s); VIS (VIS Published: 09-May-2013, VIS Presented: 15-Nov-2015);

## 2024-09-04 NOTE — CONSULT NOTE ADULT - SUBJECTIVE AND OBJECTIVE BOX
HPI:  79F with HLD, osteoarthritis, overactive bladder, GERD, anemia, hx of COPD, hx of hypothyroidism who presents electively for right knee surgery.  Pain has progressively worsened to the point where it was recommended to have surgery.  Patient seen in PACU.  Currently has no pain but also has no feeling below her waist.  Unable to move legs or feet.  No nausea.  No other complaints at the moment.      PAST MEDICAL & SURGICAL HISTORY:  Hypercholesterolemia  Overactive bladder  Osteoarthritis of right knee  GERD (gastroesophageal reflux disease)  Anemia  History of COPD  Dyslipidemia  Hypothyroidism  No significant past surgical history      REVIEW OF SYSTEMS:  CONSTITUTIONAL:    no fever or weight loss or fatigue  EYES:    no eye pain or visual disturbances or discharge  ENMT:     no difficulty hearing or tinnitus or vertigo, no sinus or throat pain  NECK:    no pain or stiffness  RESPIRATORY:    no cough or wheezing or chills or hemoptysis, no shortness of breath  CARDIOVASCULAR:    no chest pain or palpitations or dizziness or leg swelling  GASTROINTESTINAL:    no abdominal or epigastric pain. no nausea or vomiting or hematemesis, no diarrhea or constipation. no melena or hematochezia.  GENITOURINARY:    no dysuria or frequency or hematuria or incontinence  NEUROLOGICAL:    unable to move feet or legs  SKIN:    no itching or burning or rashes or lesions   LYMPH NODES:    no enlarged glands  ENDOCRINE:    no heat or cold intolerance, no hair loss, no polydipsia or polyuria  MUSCULOSKELETAL:    no joint pain or swelling, no muscle or back or extremity pain  PSYCHIATRIC:    no depression or anxiety or mood swings or difficulty sleeping  HEME/LYMPH:    no easy bruising or bleeding gums  ALLERGY AND IMMUNOLOGIC:    no hives or eczema      HOME MEDICATIONS:    · 	vitamin E: Last Dose Taken:  , 1 once a day 150 mg  · 	atorvastatin 10 mg oral tablet: Last Dose Taken:  , 1 tab(s) orally once a day  · 	biotin 10,000 mcg oral capsule: Last Dose Taken:  , 1 cap(s) orally once a day    ALLERGIES and INTOLERANCES:  No Known Allergies    SOCIAL HISTORY:  - no smoking or etoh use    FAMILY HISTORY:  FH: type 2 diabetes (Mother)    PHYSICAL EXAM:  Vital Signs Last 24 Hrs  T(C): 36.4 (04 Sep 2024 10:33), Max: 36.7 (04 Sep 2024 06:27)  T(F): 97.5 (04 Sep 2024 10:33), Max: 98.1 (04 Sep 2024 06:27)  HR: 94 (04 Sep 2024 11:00) (65 - 95)  BP: 100/57 (04 Sep 2024 10:48) (81/62 - 119/69)  BP(mean): --  RR: 19 (04 Sep 2024 11:00) (18 - 22)  SpO2: 96% (04 Sep 2024 11:00) (96% - 99%)    Parameters below as of 04 Sep 2024 11:00    O2 Flow (L/min): 3    GENERAL:     age appropriate, in NAD  HEAD:     atraumatic, normocephalic  EYES:     EOMI, conjunctiva and sclera clear  RESPIRATORY:     clear to auscultation bilaterally, no rales or rhonchi or wheezing or rubs  CARDIOVASCULAR:     regular rate and rhythm, no murmurs or rubs or gallops  GASTROINTESTINAL:     soft, nontender, nondistended, bowel sounds present  EXTREMITIES:     no clubbing or cyanosis or edema  MUSCULOSKELETAL:     no joint pain or swelling or deformities  NERVOUS SYSTEM:     unable to move feet or legs at the moment, no sensation below waist  PSYCH:     appropriate, alert and orientated x3, good concentration      LABS:                        13.8   7.09  )-----------( 201      ( 23 Aug 2024 11:40 )             42.2     23 Aug 2024 11:40    141    |  104    |  12     ----------------------------<  108<H>  4.4     |  31     |  0.85         Ca    9.5        23 Aug 2024 11:40    TPro  7.3    /  Alb  3.8    /  TBili  0.8    /  DBili  x      /  AST  18     /  ALT  21     /  AlkPhos  77     23 Aug 2024 11:40    LIVER FUNCTIONS - ( 23 Aug 2024 11:40 )  Alb: 3.8 g/dL / Pro: 7.3 g/dL / ALK PHOS: 77 U/L / ALT: 21 U/L / AST: 18 U/L / GGT: x           PT/INR - ( 23 Aug 2024 11:40 )   PT: 11.2 ;   INR: 1.00          PTT - ( 23 Aug 2024 11:40 )  PTT:31.4

## 2024-09-04 NOTE — PATIENT CHOICE NOTE. - NSPTCHOICENOTES_GEN_ALL_CORE
Transition folder and choice lists provided. Patient requesting [facility], referral initiated and will be sent upon discharge. CM will remain available.

## 2024-09-04 NOTE — CARE COORDINATION ASSESSMENT. - ASSESSMENT CONCERNS TO BE ADDRESSED
Medicare Wellness Visit  Plan for Preventive Care    A good way for you to stay healthy is to use preventive care.  Medicare covers many services that can help you stay healthy.* The goal of these services is to find any health problems as quickly as possible. Finding problems early can help make them easier to treat.  Your personal plan below lists the services you may need and when they are due.     Health Maintenance Summary     Hepatitis B Vaccine (1 of 3 - Risk 3-dose series)  Overdue - never done    Shingles Vaccine (1 of 2)  Overdue - never done    Diabetes Eye Exam (Yearly)  Overdue since 11/21/2019    Medicare Wellness Visit (Yearly)  Overdue since 9/8/2021    Diabetes A1C (Every 6 Months)  Next due on 3/10/2022    DM/CKD GFR (Yearly)  Next due on 9/10/2022    Diabetes Foot Exam (Yearly)  Next due on 10/29/2022    Depression Screening (Yearly)  Next due on 10/29/2022    DTaP/Tdap/Td Vaccine (3 - Td or Tdap)  Next due on 9/18/2030    Pneumococcal Vaccine 65+   Completed    COVID-19 Vaccine   Completed    Influenza Vaccine   Completed    Meningococcal Vaccine   Aged Out    HPV Vaccine   Aged Out           Preventive Care for Women and Men    Heart Screenings (Cardiovascular):  · Blood tests are used to check your cholesterol, lipid and triglyceride levels. High levels can increase your risk for heart disease and stroke. High levels can be treated with medications, diet and exercise. Lowering your levels can help keep your heart and blood vessels healthy.  Your provider will order these tests if they are needed.    · An ultrasound is done to see if you have an abdominal aortic aneurysm (AAA).  This is an enlargement of one of the main blood vessels that delivers blood to the body.   In the [place], 9,000 deaths are caused by AAA.  You may not even know you have this problem and as many as 1 in 3 people will have a serious problem if it is not treated.  Early diagnosis allows for more effective  treatment and cure.  If you have a family history of AAA or are a male age 65-75 who has smoked, you are at higher risk of an AAA.  Your provider can order this test, if needed.    Colorectal Screening:  · There are many tests that are used to check for cancer of your colon and rectum. You and your provider should discuss what test is best for you and when to have it done.  Options include:  · Screening Colonoscopy: exam of the entire colon, seen through a flexible lighted tube.  · Flexible Sigmoidoscopy: exam of the last third (sigmoid portion) of the colon and rectum, seen through a flexible lighted tube.  · Cologuard DNA stool test: a sample of your stool is used to screen for cancer and unseen blood in your stool.  · Fecal Occult Blood Test: a sample of your stool is studied to find any unseen blood    Flu Shot:  · An immunization that helps to prevent influenza (the flu). You should get this every year. The best time to get the shot is in the fall.    Pneumococcal Shot:  • Vaccines are available that can help prevent pneumococcal disease, which is any type of infection caused by Streptococcus pneumoniae bacteria.   Their use can prevent some cases of pneumonia, meningitis, and sepsis. There are two types of pneumococcal vaccines:   o Conjugate vaccines (PCV-13 or Prevnar 13®) - helps protect against the 13 types of pneumococcal bacteria that are the most common causes of serious infections in children and adults.    o Polysaccharide vaccine (PPSV23 or Srlvreole71®) - helps protect against 23 types of pneumococcal bacteria for patients who are recommended to get it.  These vaccines should be given at least 12 months apart.  A booster is usually not needed.     Hepatitis B Shot:  · An immunization that helps to protect people from getting Hepatitis B. Hepatitis B is a virus that spreads through contact with infected blood or body fluids. Many people with the virus do not have symptoms.  The virus can lead to  serious problems, such as liver disease. Some people are at higher risk than others. Your doctor will tell you if you need this shot.     Diabetes Screening:  · A test to measure sugar (glucose) in your blood is called a fasting blood sugar. Fasting means you cannot have food or drink for at least 8 hours before the test. This test can detect diabetes long before you may notice symptoms.    Glaucoma Screening:  · Glaucoma screening is performed by your eye doctor. The test measures the fluid pressure inside your eyes to determine if you have glaucoma.     Hepatitis C Screening:  · A blood test to see if you have the hepatitis C virus.  Hepatitis C attacks the liver and is a major cause of chronic liver disease.  Medicare will cover a single screening for all adults born between 1945 & 1965, or high risk patients (people who have injected illegal drugs or people who have had blood transfusions).  High risk patients who continue to inject illegal drugs can be screened for Hepatitis C every year.    Smoking and Tobacco-Use Cessation Counseling:  · Tobacco is the single greatest cause of disease and early death in our country today. Medication and counseling together can increase a person’s chance of quitting for good.   · Medicare covers two quitting attempts per year, with four counseling sessions per attempt (eight sessions in a 12 month period)    Preventive Screening tests for Women    Screening Mammograms and Breast Exams:  · An x-ray of your breasts to check for breast cancer before you or your doctor may be able to feel it.  If breast cancer is found early it can usually be treated with success.    Pelvic Exams and Pap Tests:  · An exam to check for cervical and vaginal cancer. A Pap test is a lab test in which cells are taken from your cervix and sent to the lab to look for signs of cervical cancer. If cancer of the cervix is found early, chances for a cure are good. Testing can generally end at age 65, or if a  woman has a hysterectomy for a benign condition. Your provider may recommend more frequent testing if certain abnormal results are found.    Bone Mass Measurements:  · A painless x-ray of your bone density to see if you are at risk for a broken bone. Bone density refers to the thickness of bones or how tightly the bone tissue is packed.    Preventive Screening tests for Men    Prostate Screening:  · Should you have a prostate cancer test (PSA)?  It is up to you to decide if you want a prostate cancer test. Talk to your clinician to find out if the test is right for you.  Things for you to consider and talk about should include:  · Benefits and harms of the test  · Your family history  · How your race/ethnicity may influence the test  · If the test may impact other medical conditions you have  · Your values on screenings and treatments    *Medicare pays for many preventive services to keep you healthy. For some of these services, you might have to pay a deductible, coinsurance, and / or copayment.  The amounts vary depending on the type of services you need and the kind of Medicare health plan you have.    For further details on screenings offered by Medicare please visit: https://www.medicare.gov/coverage/preventive-screening-services    discharge planning

## 2024-09-04 NOTE — ASU PATIENT PROFILE, ADULT - FALL HARM RISK - HARM RISK INTERVENTIONS

## 2024-09-04 NOTE — DISCHARGE NOTE PROVIDER - NSDCCPTREATMENT_GEN_ALL_CORE_FT
PRINCIPAL PROCEDURE  Procedure: Arthroplasty, knee, right, total, robot-assisted  Findings and Treatment:      PRINCIPAL PROCEDURE  Procedure: Arthroplasty, knee, right, total, robot-assisted  Findings and Treatment: Severe DJD right knee.

## 2024-09-04 NOTE — PROGRESS NOTE ADULT - SUBJECTIVE AND OBJECTIVE BOX
Orthopaedic Post Op Note    Procedure: Right TKR  Surgeon: Camilo    79y Female comfortable, without complaints. Up to side of bed with PT, but was unable to walk secondary to residual numbness from regional anestheisa. Tolerating diet. Has not voided yet- Primafit in place for comfort.  Resting in bed.   Reported pain score = 0/10 at this likely due to residual anesthesia. Tolerating all medications and pain is well controlled on current regimen.   Patient confirms reduced sensation in LLE versus RLE, but sensation to light touch still intact.  Denies N/V, CP, SOB, tingling of extremities.    PE:  Vital Signs Last 24 Hrs  T(C): 36.8 (04 Sep 2024 13:10), Max: 36.8 (04 Sep 2024 13:10)  T(F): 98.3 (04 Sep 2024 13:10), Max: 98.3 (04 Sep 2024 13:10)  HR: 84 (04 Sep 2024 13:10) (65 - 98)  BP: 100/54 (04 Sep 2024 13:10) (81/62 - 119/69)  RR: 20 (04 Sep 2024 13:10) (18 - 22)  SpO2: 97% (04 Sep 2024 13:10) (96% - 99%)    Parameters below as of 04 Sep 2024 13:10  Patient On (Oxygen Delivery Method): room air      General: Pt alert and oriented, NAD  Abd: soft, NT, ND  Right Knee Bulky Dressing: C/D/I, Cryo/cuff in place   Bilateral LEs:  Motor:   5/5 dorsiflexion, plantarflexion, EHL  Sensation intact to LT, but LLE is reduced compared to RLE. Still confirms light touch  + DP Pulses  SCDs in place          A/P: 79y Female stable POD#0 s/p Right TKR  - Stable  - Acetaminophen, Celebrex, Oxycodone, Dilaudid   for Pain Control  - Cryotherapy and elevation of operative extremity to reduce pain and swelling  - Incentive Spirometry   - DVT ppx: Aspirin 81mg q 12 h   - Heather op IV abx: Ancef  - Medicine co-management  - PT, OT per protocol  - F/U AM Labs  DCP = home tomorrow pending PT, OT, medical clearance        Orthopaedic Post Op Note    Procedure: Right TKR  Surgeon: Camilo    79y Female comfortable, without complaints. Up to side of bed with PT, but was unable to walk secondary to residual numbness from regional anestheisa. Tolerating diet. Has not voided yet- Primafit in place for comfort.  Resting in bed.   Reported pain score = 0/10 at this likely due to residual anesthesia. Tolerating all medications and pain is well controlled on current regimen.   Patient confirms reduced sensation in LLE versus RLE, but sensation to light touch still intact.  Denies N/V, CP, SOB, tingling of extremities.    PE:  Vital Signs Last 24 Hrs  T(C): 36.8 (04 Sep 2024 13:10), Max: 36.8 (04 Sep 2024 13:10)  T(F): 98.3 (04 Sep 2024 13:10), Max: 98.3 (04 Sep 2024 13:10)  HR: 84 (04 Sep 2024 13:10) (65 - 98)  BP: 100/54 (04 Sep 2024 13:10) (81/62 - 119/69)  RR: 20 (04 Sep 2024 13:10) (18 - 22)  SpO2: 97% (04 Sep 2024 13:10) (96% - 99%)    Parameters below as of 04 Sep 2024 13:10  Patient On (Oxygen Delivery Method): room air      General: Pt alert and oriented, NAD  Abd: soft, NT, ND  Right Knee Bulky Dressing: C/D/I, Cryo/cuff in place   Bilateral LEs:  Motor:   5/5 dorsiflexion, plantarflexion, EHL  Sensation intact to LT, but LLE is reduced compared to RLE. Still confirms light touch  + DP Pulses  SCDs in place          A/P: 79y Female stable POD#0 s/p Right TKR  - Stable  - Acetaminophen, Celebrex, Oxycodone, Dilaudid   for Pain Control  - Cryotherapy and elevation of operative extremity to reduce pain and swelling  - Incentive Spirometry   - Monitor for resolution of regional anesthesia  - DVT ppx: Aspirin 81mg q 12 h   - Heather op IV abx: Ancef  - Medicine co-management  - PT, OT per protocol  - F/U AM Labs  DCP = home tomorrow pending PT, OT, medical clearance

## 2024-09-04 NOTE — DISCHARGE NOTE NURSING/CASE MANAGEMENT/SOCIAL WORK - NSDCPEFALRISK_GEN_ALL_CORE
For information on Fall & Injury Prevention, visit: [email or web address] OR  [email or web address] OR  https://www.cdc.gov/steadi/patient.html

## 2024-09-04 NOTE — CARE COORDINATION ASSESSMENT. - NSTRANSPORTNEEDS_GEN_ALL_CORE
Pt p/w SOB, cough x 2 weeks. 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 DDx includes worsening disease, bacterial PNA, COPD exacerbation. Low suspicion PE given infectious sx, no clinical si / sx DVT / PE. Sx began prior to travel from [place]. No PE risk factors. Well's low risk group. Check labs, CXR. Trial of duonebs, steroids. Dispo pending w/u and clinical status
daughter will transport home/Auto

## 2024-09-04 NOTE — DISCHARGE NOTE PROVIDER - NSDCFUADDINST_GEN_ALL_CORE_FT
For your total knee replacement:  Physical Therapy/Occupational Therapy for: ambulation, transfers, stairs, ADL's (activities of daily living), range of motion exercises, and isometrics  -Activity  • Weight Bearing as tolerated with rolling walker.  • Cryo/cuff 20 minutes several times daily with at least a 1 hour break in between icing sessions  • Take short, frequent walks increasing the distance that you walk each day as tolerated.  • Change your position every hour to decrease pain and stiffness.  • Continue the exercises taught to you by your physical therapist.  • No driving until cleared by the doctor.  • No tub baths, hot tubs, or swimming pools until instructed by your doctor.  • Do not squat down on the floor.  • Do not kneel or twist your knee.  • Range of Motion Goals: Flexion= 120 degrees, Extension = 0 degrees  Daily dressing changes if incision is not completely dry.  If inicision is dry, it may be left open to air.  Keep incision clean. DO NOT APPLY ANYTHING to incision site (salves/ointments/creams).  May shower post-op if no drainage from incision.  Do not scrub incision site. Pat dry after shower.  Suture/Prineo tape removal 2 weeks after surgery at Surgeon's office.     - Call your doctor if you experience:  • An increase in pain not controlled by pain medication or change in activity or  position.  • Temperature greater than 101° F.  • Redness, increased swelling or foul smelling drainage from or around the  incision.  • Numbness, tingling or a change in color or temperature of the operative leg.  • Call your doctor immediately if you experience chest pain, shortness of breath or calf pain.     You have a Mepilex dressing. You may shower. Mepilex dressing is water resistant, not waterproof. Do not aim shower stream at surgical site.  Pat dry after showering.  Remove Mepilex dressing in 1 week. You have steristrips over your incision which will fall off on their own.  Keep incision clean. DO NOT APPLY ANYTHING to incision site (salves/ointments/creams).  May shower.  Do not scrub incision site. Pat dry after shower.  For your total knee replacement:  Physical Therapy/Occupational Therapy for: ambulation, transfers, stairs, ADL's (activities of daily living), range of motion exercises, and isometrics  -Activity  • Weight Bearing as tolerated with rolling walker.  • Cryo/cuff 20 minutes several times daily with at least a 1 hour break in between icing sessions  • Take short, frequent walks increasing the distance that you walk each day as tolerated.  • Change your position every hour to decrease pain and stiffness.  • Continue the exercises taught to you by your physical therapist.  • No driving until cleared by the doctor.  • No tub baths, hot tubs, or swimming pools until instructed by your doctor.  • Do not squat down on the floor.  • Do not kneel or twist your knee.  • Range of Motion Goals: Flexion= 120 degrees, Extension = 0 degrees  Daily dressing changes if incision is not completely dry.  If inicision is dry, it may be left open to air.  Keep incision clean. DO NOT APPLY ANYTHING to incision site (salves/ointments/creams).  May shower post-op if no drainage from incision.  Do not scrub incision site. Pat dry after shower.  Suture/Prineo tape removal 2 weeks after surgery at Surgeon's office.     - Call your doctor if you experience:  • An increase in pain not controlled by pain medication or change in activity or  position.  • Temperature greater than 101° F.  • Redness, increased swelling or foul smelling drainage from or around the  incision.  • Numbness, tingling or a change in color or temperature of the operative leg.  • Call your doctor immediately if you experience chest pain, shortness of breath or calf pain.     You have a Mepilex dressing. You may shower. Mepilex dressing is water resistant, not waterproof. Do not aim shower stream at surgical site.  Pat dry after showering.  Remove Mepilex dressing in 1 week. You have steristrips over your incision which will fall off on their own.  Keep incision clean. DO NOT APPLY ANYTHING to incision site (salves/ointments/creams).  May shower.  Do not scrub incision site. Pat dry after shower.     You may take Tylenol/Acetaminophen in addition to the narcotic to help alleviate the post surgical pain. You may buy the medication Over-the-Counter (OTC) but ensure that acetaminophen is the only ingredient (no combination medications). Follow dosing instructions on the box. If pain is mild, may take alone without the narcotic.  - Call your doctor if you experience:  • An increase in pain not controlled by pain medication or change in activity or  position.  • Temperature greater than 101° F.  • Redness, increased swelling or foul smelling drainage from or around the  incision.  • Numbness, tingling or a change in color or temperature of the operative leg.  • Call your doctor immediately if you experience chest pain, shortness of breath or calf pain.

## 2024-09-04 NOTE — DISCHARGE NOTE PROVIDER - NSDCACTIVITY_GEN_ALL_CORE
Do not drive or operate machinery/Do not make important decisions/Walking - Indoors allowed/No heavy lifting/straining/Walking - Outdoors allowed/Follow Instructions Provided by your Surgical Team Do not drive or operate machinery/Showering allowed/Do not make important decisions/Stairs allowed/Walking - Indoors allowed/No heavy lifting/straining/Walking - Outdoors allowed/Follow Instructions Provided by your Surgical Team

## 2024-09-04 NOTE — PHYSICAL THERAPY INITIAL EVALUATION ADULT - TRANSFER SAFETY CONCERNS NOTED: SIT/STAND, REHAB EVAL
pt with residual numbness from anesthesi/inability to maintain weight-bearing restrictions w/o assist/decreased weight-shifting ability

## 2024-09-04 NOTE — DISCHARGE NOTE PROVIDER - CARE PROVIDER_API CALL
BENNIE GARCIA  [address], [address]  [place] [address]  Phone: [phone]  Fax: [phone]  Follow Up Time:    Esau Page  Orthopaedic Surgery  [address], [address]  [place] [address]  Phone: [phone]  Fax: [phone]  Established Patient  Scheduled Appointment: 09/19/2024 01:00 PM

## 2024-09-04 NOTE — CARE COORDINATION ASSESSMENT. - NSCAREPROVIDERS_GEN_ALL_CORE_FT
CARE PROVIDERS:  Administration: Quintin Chang  Admitting: Esau Page  Attending: Esau Page  Consultant: Marvin Hammonds  Covering Team: Koko Bradley  Nurse: Nancy Arce  Nurse: Dana Kendrick  Nurse: Belia Owens  Nurse: Anais Camarillo  Nurse: Claudia Almanza  Nurse: Fatou Espinoza  Nurse: Katina Galindo  Occupational Therapy: Gilson Ferrara  Occupational Therapy: Lavern Michael  Override: Anais Camarillo  Physical Therapy: Joseph Guerra  Physical Therapy: Jeni Cormier  Physical Therapy: Hair Barron  Physical Therapy: Rocio Moreno  Physical Therapy: Stephanie Villagomez  Primary Team: Angelo Escobedo  Primary Team: Ronan Toro  Primary Team: Ze Morales  Primary Team: Danis Pham  Primary Team: Shell Celaya  Team: [facility]  Hospitalists, Team  UR// Supp. Assoc.: Bernice Solis

## 2024-09-04 NOTE — ASU PATIENT PROFILE, ADULT - NS PRO AD INFO GIVEN Y
Impression: S/P Cataract Extraction by phacoemulsification with IOL placement OD - 1 Day. Encounter for surgical aftercare following surgery on a sense organ  Z48.810. Plan: S/P Cataract extraction right eye with placement of intraocular Dexycu or subconjunctival Kenalog and moxifloxacin intracamerally - post-op day #1 - Advised patient that likely will see floaters for 1-2 weeks. Advised no heavy lifting or strenuous activity for at least one week and no swimming for at least three weeks. Use eye shield at night for one week. Patient advised to call immediately for any problems including, but not limited to, pain, redness, increase in floaters, flashing lights, changes in peripheral vision, decreased vision, and/or any other problems or concerns. Patient stated an understanding of all the instructions. yes

## 2024-09-04 NOTE — ASU PATIENT PROFILE, ADULT - NSICDXPASTMEDICALHX_GEN_ALL_CORE_FT
PAST MEDICAL HISTORY:  Anemia     Dyslipidemia     GERD (gastroesophageal reflux disease)     History of COPD     Hypercholesterolemia     Hypothyroidism     Osteoarthritis of right knee     Overactive bladder

## 2024-09-04 NOTE — DISCHARGE NOTE PROVIDER - NSDCCPCAREPLAN_GEN_ALL_CORE_FT
PRINCIPAL DISCHARGE DIAGNOSIS  Diagnosis: Right knee DJD  Assessment and Plan of Treatment: - Call your doctor if you experience:  • An increase in pain not controlled by pain medication or change in activity or  position.  • Temperature greater than 101° F.  • Redness, increased swelling or foul smelling drainage from or around the  incision.  • Numbness, tingling or a change in color or temperature of the operative leg.  • Call your doctor immediately if you experience chest pain, shortness of breath or calf pain.   You have a Mepilex dressing. You may shower. Mepilex dressing is water resistant, not waterproof. Do not aim shower stream at surgical site.  Pat dry after showering.  Remove Mepilex dressing in 1 week. You have steristrips over your incision which will fall off on their own.  Keep incision clean. DO NOT APPLY ANYTHING to incision site (salves/ointments/creams).  May shower.  Do not scrub incision site. Pat dry after shower.     PRINCIPAL DISCHARGE DIAGNOSIS  Diagnosis: Right knee DJD  Assessment and Plan of Treatment: For your total knee replacement:  Physical Therapy/Occupational Therapy for: ambulation, transfers, stairs, ADL's (activities of daily living), range of motion exercises, and isometrics  -Activity  • Weight Bearing as tolerated with rolling walker.  • Cryo/cuff 20 minutes several times daily with at least a 1 hour break in between icing sessions  • Take short, frequent walks increasing the distance that you walk each day as tolerated.  • Change your position every hour to decrease pain and stiffness.  • Continue the exercises taught to you by your physical therapist.  • No driving until cleared by the doctor.  • No tub baths, hot tubs, or swimming pools until instructed by your doctor.  • Do not squat down on the floor.  • Do not kneel or twist your knee.  • Range of Motion Goals: Flexion= 120 degrees, Extension = 0 degrees  Daily dressing changes if incision is not completely dry.  If inicision is dry, it may be left open to air.  Keep incision clean. DO NOT APPLY ANYTHING to incision site (salves/ointments/creams).  May shower post-op if no drainage from incision.  Do not scrub incision site. Pat dry after shower.  - Call your doctor if you experience:  • An increase in pain not controlled by pain medication or change in activity or  position.  • Temperature greater than 101° F.  • Redness, increased swelling or foul smelling drainage from or around the  incision.  • Numbness, tingling or a change in color or temperature of the operative leg.  • Call your doctor immediately if you experience chest pain, shortness of breath or calf pain.   You have a Mepilex dressing. You may shower. Mepilex dressing is water resistant, not waterproof. Do not aim shower stream at surgical site.  Pat dry after showering.  Remove Mepilex dressing in 1 week. You have steristrips over your incision which will fall off on their own.  Keep incision clean. DO NOT APPLY ANYTHING to incision site (salves/ointments/creams).  May shower.  Do not scrub incision site. Pat dry after shower.     PRINCIPAL DISCHARGE DIAGNOSIS  Diagnosis: Right knee DJD  Assessment and Plan of Treatment: For your total knee replacement:  Physical Therapy/Occupational Therapy for: ambulation, transfers, stairs, ADL's (activities of daily living), range of motion exercises, and isometrics  -Activity  • Weight Bearing as tolerated with rolling walker.  • Cryo/cuff 30 minutes several times daily with at least a 1 hour break in between icing sessions, use thin cloth barrier to protect skin  • Take short, frequent walks increasing the distance that you walk each day as tolerated.  • Change your position every hour to decrease pain and stiffness.  • Continue the exercises taught to you by your physical therapist.  • No driving until cleared by the doctor.  • No tub baths, hot tubs, or swimming pools until instructed by your doctor.  • Do not squat down on the floor.  • Do not kneel or twist your knee.  • Range of Motion Goals: Flexion= 120 degrees, Extension = 0 degrees   You have a Mepilex dressing. You may shower. Mepilex dressing is water resistant, not waterproof. Do not aim shower stream at surgical site.  Pat dry after showering.  Remove Mepilex dressing in 1 week. You have steristrips over your incision which will fall off on their own.  Keep incision clean. DO NOT APPLY ANYTHING to incision site (salves/ointments/creams).  May shower.  Do not scrub incision site. Pat dry after shower.   If inicision is dry, it may be left open to air.       PRINCIPAL DISCHARGE DIAGNOSIS  Diagnosis: Right knee DJD  Assessment and Plan of Treatment: For your total knee replacement:  Physical Therapy/Occupational Therapy for: ambulation, transfers, stairs, ADL's (activities of daily living), range of motion exercises, and isometrics  -Activity  • Weight Bearing as tolerated with rolling walker.  • Cryo/cuff 30 minutes several times daily with at least a 1 hour break in between icing sessions, use thin cloth barrier to protect skin  • Take short, frequent walks increasing the distance that you walk each day as tolerated.  • Change your position every hour to decrease pain and stiffness.  • Continue the exercises taught to you by your physical therapist.  • No driving until cleared by the doctor.  • No tub baths, hot tubs, or swimming pools until instructed by your doctor.  • Do not squat down on the floor.  • Do not kneel or twist your knee.  • Range of Motion Goals: Flexion= 120 degrees, Extension = 0 degrees   You have a Mepilex dressing. You may shower. Mepilex dressing is water resistant, not waterproof. Do not aim shower stream at surgical site.  Pat dry after showering.  Remove Mepilex dressing in 1 week. You have steristrips over your incision which will fall off on their own.  Keep incision clean. DO NOT APPLY ANYTHING to incision site (salves/ointments/creams).  May shower.  Do not scrub incision site. Pat dry after shower.   If inciision is dry, it may be left open to air.  Opioid safety:.  Do not keep opioid in the medicine cabinet in bathroom or on kitchen counter where others may have access to it.  Do not drive while taking opioid.  To dispose of it some pharmacies do have drop boxes as do police stations.  Do not flush or put in trash.

## 2024-09-04 NOTE — DISCHARGE NOTE PROVIDER - NSDCMRMEDTOKEN_GEN_ALL_CORE_FT
atorvastatin 10 mg oral tablet: 1 tab(s) orally once a day  biotin 10,000 mcg oral capsule: 1 cap(s) orally once a day  vitamin E: 1 once a day 150 mg   acetaminophen 500 mg oral tablet: 2 tab(s) orally every 8 hours  aspirin 81 mg oral delayed release tablet: 1 tab(s) orally every 12 hours take 2 hrs before celebrex  atorvastatin 10 mg oral tablet: 1 tab(s) orally once a day  biotin 10,000 mcg oral capsule: 1 cap(s) orally once a day  celecoxib 200 mg oral capsule: 1 cap(s) orally every 12 hours take 2 hrs after ecotrin  omeprazole 20 mg oral delayed release capsule: 1 cap(s) orally once a day before breakfast  oxyCODONE 5 mg oral tablet: 1 tab(s) orally every 4 hours as needed for  moderate pain or 2 tabs every 4-6 hrs as needed for severe pain    [id number] MDD: 6  polyethylene glycol 3350 oral powder for reconstitution: 17 gram(s) orally once a day (at bedtime)  senna leaf extract oral tablet: 2 tab(s) orally once a day (at bedtime)  vitamin E: 1 once a day 150 mg

## 2024-09-04 NOTE — CONSULT NOTE ADULT - ASSESSMENT
79F with HLD, osteoarthritis, overactive bladder, GERD, anemia, hx of COPD, hx of hypothyroidism who presents electively for right knee surgery.     Right knee surgery for osteoarthritis  - post-op care as per ortho  - DVT ppx and pain control as per ortho/anesthesia  - PT/OT  - advance diet as tolerated  - anti-emetics PRN  - incentive spirometer  - bowel regiment    HLD  - cont with atorvastatin    Preventive measures  - as per ortho protocol -> ASA 81mg BID

## 2024-09-04 NOTE — OCCUPATIONAL THERAPY INITIAL EVALUATION ADULT - ADDITIONAL COMMENTS
Pt lives w/ her daughter in  a private home [address] +railing, pt reports all needs are met on main floor Pt owns a RW and commode

## 2024-09-04 NOTE — BRIEF OPERATIVE NOTE - NSICDXBRIEFPROCEDURE_GEN_ALL_CORE_FT
PROCEDURES:  Arthroplasty, knee, right, total, robot-assisted 04-Sep-2024 10:45:48  Shell Celaya A

## 2024-09-04 NOTE — CARE COORDINATION ASSESSMENT. - NSPASTMEDSURGHISTORY_GEN_ALL_CORE_FT
PAST MEDICAL & SURGICAL HISTORY:  No significant past surgical history      Hypothyroidism      Dyslipidemia      History of COPD      Anemia      GERD (gastroesophageal reflux disease)      Osteoarthritis of right knee      Overactive bladder      Hypercholesterolemia

## 2024-09-04 NOTE — DISCHARGE NOTE PROVIDER - NSDCFUSCHEDAPPT_GEN_ALL_CORE_FT
Esau Page  [facility]  ORTHOSURG [address] [facility]  Scheduled Appointment: 09/19/2024

## 2024-09-04 NOTE — DISCHARGE NOTE PROVIDER - CARE PROVIDERS DIRECT ADDRESSES
,DirectAddress_Unknown ,[email or web address]

## 2024-09-05 LAB
ANION GAP SERPL CALC-SCNC: 4 MMOL/L — LOW (ref 5–17)
BUN SERPL-MCNC: 16 MG/DL — SIGNIFICANT CHANGE UP (ref 7–23)
CALCIUM SERPL-MCNC: 9.2 MG/DL — SIGNIFICANT CHANGE UP (ref 8.4–10.5)
CHLORIDE SERPL-SCNC: 105 MMOL/L — SIGNIFICANT CHANGE UP (ref 96–108)
CO2 SERPL-SCNC: 28 MMOL/L — SIGNIFICANT CHANGE UP (ref 22–31)
CREAT SERPL-MCNC: 1.04 MG/DL — SIGNIFICANT CHANGE UP (ref 0.5–1.3)
EGFR: 54 ML/MIN/1.73M2 — LOW
GLUCOSE SERPL-MCNC: 138 MG/DL — HIGH (ref 70–99)
HCT VFR BLD CALC: 34.3 % — LOW (ref 34.5–45)
HGB BLD-MCNC: 11.1 G/DL — LOW (ref 11.5–15.5)
MCHC RBC-ENTMCNC: 28.9 PG — SIGNIFICANT CHANGE UP (ref 27–34)
MCHC RBC-ENTMCNC: 32.4 GM/DL — SIGNIFICANT CHANGE UP (ref 32–36)
MCV RBC AUTO: 89.3 FL — SIGNIFICANT CHANGE UP (ref 80–100)
NRBC # BLD: 0 /100 WBCS — SIGNIFICANT CHANGE UP (ref 0–0)
PLATELET # BLD AUTO: 165 K/UL — SIGNIFICANT CHANGE UP (ref 150–400)
POTASSIUM SERPL-MCNC: 4.3 MMOL/L — SIGNIFICANT CHANGE UP (ref 3.5–5.3)
POTASSIUM SERPL-SCNC: 4.3 MMOL/L — SIGNIFICANT CHANGE UP (ref 3.5–5.3)
RBC # BLD: 3.84 M/UL — SIGNIFICANT CHANGE UP (ref 3.8–5.2)
RBC # FLD: 13.3 % — SIGNIFICANT CHANGE UP (ref 10.3–14.5)
SODIUM SERPL-SCNC: 137 MMOL/L — SIGNIFICANT CHANGE UP (ref 135–145)
WBC # BLD: 14.45 K/UL — HIGH (ref 3.8–10.5)
WBC # FLD AUTO: 14.45 K/UL — HIGH (ref 3.8–10.5)

## 2024-09-05 PROCEDURE — 99232 SBSQ HOSP IP/OBS MODERATE 35: CPT

## 2024-09-05 RX ORDER — POLYETHYLENE GLYCOL 3350 17 G/17G
17 POWDER, FOR SOLUTION ORAL
Qty: 0 | Refills: 0 | DISCHARGE
Start: 2024-09-05

## 2024-09-05 RX ORDER — ACETAMINOPHEN 325 MG/1
2 TABLET ORAL
Qty: 0 | Refills: 0 | DISCHARGE
Start: 2024-09-05

## 2024-09-05 RX ORDER — ASPIRIN 81 MG
1 TABLET, DELAYED RELEASE (ENTERIC COATED) ORAL
Qty: 56 | Refills: 0
Start: 2024-09-05 | End: 2024-10-02

## 2024-09-05 RX ORDER — SENNA 187 MG
2 TABLET ORAL
Qty: 0 | Refills: 0 | DISCHARGE
Start: 2024-09-05

## 2024-09-05 RX ORDER — OMEPRAZOLE 40 MG/1
1 CAPSULE, DELAYED RELEASE ORAL
Qty: 30 | Refills: 0
Start: 2024-09-05 | End: 2024-10-04

## 2024-09-05 RX ORDER — CELECOXIB 400 MG/1
1 CAPSULE ORAL
Qty: 56 | Refills: 0
Start: 2024-09-05 | End: 2024-10-02

## 2024-09-05 RX ADMIN — Medication 101.6 MILLIGRAM(S): at 05:19

## 2024-09-05 RX ADMIN — CEFAZOLIN SODIUM 100 MILLIGRAM(S): 2 INJECTION, SOLUTION INTRAVENOUS at 00:35

## 2024-09-05 RX ADMIN — Medication 40 MILLIGRAM(S): at 05:20

## 2024-09-05 RX ADMIN — Medication 2 TABLET(S): at 21:04

## 2024-09-05 RX ADMIN — Medication 10 MILLIGRAM(S): at 21:05

## 2024-09-05 RX ADMIN — ACETAMINOPHEN 1000 MILLIGRAM(S): 325 TABLET ORAL at 05:27

## 2024-09-05 RX ADMIN — CELECOXIB 200 MILLIGRAM(S): 400 CAPSULE ORAL at 08:29

## 2024-09-05 RX ADMIN — ACETAMINOPHEN 1000 MILLIGRAM(S): 325 TABLET ORAL at 05:20

## 2024-09-05 RX ADMIN — ACETAMINOPHEN 1000 MILLIGRAM(S): 325 TABLET ORAL at 21:08

## 2024-09-05 RX ADMIN — ACETAMINOPHEN 1000 MILLIGRAM(S): 325 TABLET ORAL at 13:13

## 2024-09-05 RX ADMIN — Medication 81 MILLIGRAM(S): at 17:19

## 2024-09-05 RX ADMIN — ACETAMINOPHEN 1000 MILLIGRAM(S): 325 TABLET ORAL at 05:49

## 2024-09-05 RX ADMIN — CELECOXIB 200 MILLIGRAM(S): 400 CAPSULE ORAL at 21:04

## 2024-09-05 RX ADMIN — ACETAMINOPHEN 1000 MILLIGRAM(S): 325 TABLET ORAL at 21:04

## 2024-09-05 RX ADMIN — Medication 81 MILLIGRAM(S): at 05:20

## 2024-09-05 RX ADMIN — CELECOXIB 200 MILLIGRAM(S): 400 CAPSULE ORAL at 21:08

## 2024-09-05 RX ADMIN — Medication 160 MILLIGRAM(S): at 06:02

## 2024-09-05 NOTE — PROGRESS NOTE ADULT - SUBJECTIVE AND OBJECTIVE BOX
Patient is a 79y old  Female who presents with a chief complaint of Right Knee Arthritis (05 Sep 2024 07:08)    INTERVAL HPI/OVERNIGHT EVENTS:      MEDICATIONS  (STANDING):  acetaminophen     Tablet .. 1000 milliGRAM(s) Oral every 8 hours  aspirin enteric coated 81 milliGRAM(s) Oral every 12 hours  atorvastatin 10 milliGRAM(s) Oral at bedtime  celecoxib 200 milliGRAM(s) Oral every 12 hours  lactated ringers. 1000 milliLiter(s) (100 mL/Hr) IV Continuous <Continuous>  pantoprazole    Tablet 40 milliGRAM(s) Oral before breakfast  polyethylene glycol 3350 17 Gram(s) Oral at bedtime  senna 2 Tablet(s) Oral at bedtime  sodium chloride 0.9% Bolus 500 milliLiter(s) IV Bolus once    MEDICATIONS  (PRN):  HYDROmorphone  Injectable 0.5 milliGRAM(s) IV Push every 3 hours PRN Breakthrough pain  magnesium hydroxide Suspension 30 milliLiter(s) Oral daily PRN Constipation  ondansetron Injectable 4 milliGRAM(s) IV Push every 6 hours PRN Nausea and/or Vomiting  oxyCODONE    IR 10 milliGRAM(s) Oral every 3 hours PRN Severe Pain (7 - 10)  oxyCODONE    IR 5 milliGRAM(s) Oral every 3 hours PRN Moderate Pain (4 - 6)      Allergies    No Known Allergies    Intolerances        ROS as above and reviewed and is otherwise negative        PHYSICAL EXAM:  Vital Signs Last 24 Hrs  T(C): 36.4 (05 Sep 2024 08:16), Max: 36.8 (04 Sep 2024 13:10)  T(F): 97.5 (05 Sep 2024 08:16), Max: 98.3 (04 Sep 2024 13:10)  HR: 60 (05 Sep 2024 08:16) (60 - 98)  BP: 105/68 (05 Sep 2024 08:16) (81/62 - 105/68)  BP(mean): --  RR: 17 (05 Sep 2024 08:16) (16 - 22)  SpO2: 96% (05 Sep 2024 08:16) (91% - 99%)    Parameters below as of 04 Sep 2024 13:10  Patient On (Oxygen Delivery Method): room air        GENERAL:     age appropriate, in NAD  HEAD:     atraumatic, normocephalic  EYES:     EOMI, conjunctiva and sclera clear  RESPIRATORY:     clear to auscultation bilaterally, no rales or rhonchi or wheezing or rubs  CARDIOVASCULAR:     regular rate and rhythm, no murmurs or rubs or gallops  GASTROINTESTINAL:     soft, nontender, nondistended, bowel sounds present  EXTREMITIES:     no clubbing or cyanosis or edema  MUSCULOSKELETAL:     no joint pain or swelling or deformities  NERVOUS SYSTEM:     motor strength intact with 5/5 B/L upper and lower extremities, no gross sensory deficits  PSYCH:     appropriate, alert and orientated x3, good concentration      LABS:                        11.1   14.45 )-----------( 165      ( 05 Sep 2024 06:00 )             34.3     05 Sep 2024 06:00    137    |  105    |  16     ----------------------------<  138    4.3     |  28     |  1.04     Ca    9.2        05 Sep 2024 06:00        Urinalysis Basic - ( 05 Sep 2024 06:00 )    Color: x / Appearance: x / SG: x / pH: x  Gluc: 138 mg/dL / Ketone: x  / Bili: x / Urobili: x   Blood: x / Protein: x / Nitrite: x   Leuk Esterase: x / RBC: x / WBC x   Sq Epi: x / Non Sq Epi: x / Bacteria: x      CAPILLARY BLOOD GLUCOSE           Patient is a 79y old  Female who presents with a chief complaint of Right Knee Arthritis (05 Sep 2024 07:08)    INTERVAL HPI/OVERNIGHT EVENTS:  No acute events overnight.  This AM, patient reports that she had a bad night because of the pain but now it lessened.     MEDICATIONS  (STANDING):  acetaminophen     Tablet .. 1000 milliGRAM(s) Oral every 8 hours  aspirin enteric coated 81 milliGRAM(s) Oral every 12 hours  atorvastatin 10 milliGRAM(s) Oral at bedtime  celecoxib 200 milliGRAM(s) Oral every 12 hours  lactated ringers. 1000 milliLiter(s) (100 mL/Hr) IV Continuous <Continuous>  pantoprazole    Tablet 40 milliGRAM(s) Oral before breakfast  polyethylene glycol 3350 17 Gram(s) Oral at bedtime  senna 2 Tablet(s) Oral at bedtime  sodium chloride 0.9% Bolus 500 milliLiter(s) IV Bolus once    MEDICATIONS  (PRN):  HYDROmorphone  Injectable 0.5 milliGRAM(s) IV Push every 3 hours PRN Breakthrough pain  magnesium hydroxide Suspension 30 milliLiter(s) Oral daily PRN Constipation  ondansetron Injectable 4 milliGRAM(s) IV Push every 6 hours PRN Nausea and/or Vomiting  oxyCODONE    IR 10 milliGRAM(s) Oral every 3 hours PRN Severe Pain (7 - 10)  oxyCODONE    IR 5 milliGRAM(s) Oral every 3 hours PRN Moderate Pain (4 - 6)      Allergies    No Known Allergies    Intolerances        ROS as above and reviewed and is otherwise negative        PHYSICAL EXAM:  Vital Signs Last 24 Hrs  T(C): 36.4 (05 Sep 2024 08:16), Max: 36.8 (04 Sep 2024 13:10)  T(F): 97.5 (05 Sep 2024 08:16), Max: 98.3 (04 Sep 2024 13:10)  HR: 60 (05 Sep 2024 08:16) (60 - 98)  BP: 105/68 (05 Sep 2024 08:16) (81/62 - 105/68)  BP(mean): --  RR: 17 (05 Sep 2024 08:16) (16 - 22)  SpO2: 96% (05 Sep 2024 08:16) (91% - 99%)    Parameters below as of 04 Sep 2024 13:10  Patient On (Oxygen Delivery Method): room air        GENERAL:     age appropriate, in NAD  HEAD:     atraumatic, normocephalic  EYES:     EOMI, conjunctiva and sclera clear  RESPIRATORY:     clear to auscultation bilaterally, no rales or rhonchi or wheezing or rubs  CARDIOVASCULAR:     regular rate and rhythm, no murmurs or rubs or gallops  GASTROINTESTINAL:     soft, nontender, nondistended, bowel sounds present  EXTREMITIES:     no clubbing or cyanosis or edema  MUSCULOSKELETAL:     no joint pain or swelling or deformities  NERVOUS SYSTEM:     motor strength intact with 5/5 B/L upper and lower extremities, no gross sensory deficits  PSYCH:     appropriate, alert and orientated x3, good concentration      LABS:                        11.1   14.45 )-----------( 165      ( 05 Sep 2024 06:00 )             34.3     05 Sep 2024 06:00    137    |  105    |  16     ----------------------------<  138    4.3     |  28     |  1.04     Ca    9.2        05 Sep 2024 06:00        Urinalysis Basic - ( 05 Sep 2024 06:00 )    Color: x / Appearance: x / SG: x / pH: x  Gluc: 138 mg/dL / Ketone: x  / Bili: x / Urobili: x   Blood: x / Protein: x / Nitrite: x   Leuk Esterase: x / RBC: x / WBC x   Sq Epi: x / Non Sq Epi: x / Bacteria: x      CAPILLARY BLOOD GLUCOSE           Patient is a 79y old  Female who presents with a chief complaint of Right Knee Arthritis (05 Sep 2024 07:08)    INTERVAL HPI/OVERNIGHT EVENTS:  No acute events overnight.  This AM, patient reports that she had a bad night because of the pain but now it lessened.  No new numbness or weakness.  Voiding but has not moved her bowels.  No nausea.  Tolerating PO.       MEDICATIONS  (STANDING):  acetaminophen     Tablet .. 1000 milliGRAM(s) Oral every 8 hours  aspirin enteric coated 81 milliGRAM(s) Oral every 12 hours  atorvastatin 10 milliGRAM(s) Oral at bedtime  celecoxib 200 milliGRAM(s) Oral every 12 hours  lactated ringers. 1000 milliLiter(s) (100 mL/Hr) IV Continuous <Continuous>  pantoprazole    Tablet 40 milliGRAM(s) Oral before breakfast  polyethylene glycol 3350 17 Gram(s) Oral at bedtime  senna 2 Tablet(s) Oral at bedtime  sodium chloride 0.9% Bolus 500 milliLiter(s) IV Bolus once    MEDICATIONS  (PRN):  HYDROmorphone  Injectable 0.5 milliGRAM(s) IV Push every 3 hours PRN Breakthrough pain  magnesium hydroxide Suspension 30 milliLiter(s) Oral daily PRN Constipation  ondansetron Injectable 4 milliGRAM(s) IV Push every 6 hours PRN Nausea and/or Vomiting  oxyCODONE    IR 10 milliGRAM(s) Oral every 3 hours PRN Severe Pain (7 - 10)  oxyCODONE    IR 5 milliGRAM(s) Oral every 3 hours PRN Moderate Pain (4 - 6)      Allergies  No Known Allergies    ROS as above and reviewed and is otherwise negative    PHYSICAL EXAM:  Vital Signs Last 24 Hrs  T(C): 36.4 (05 Sep 2024 08:16), Max: 36.8 (04 Sep 2024 13:10)  T(F): 97.5 (05 Sep 2024 08:16), Max: 98.3 (04 Sep 2024 13:10)  HR: 60 (05 Sep 2024 08:16) (60 - 98)  BP: 105/68 (05 Sep 2024 08:16) (81/62 - 105/68)  BP(mean): --  RR: 17 (05 Sep 2024 08:16) (16 - 22)  SpO2: 96% (05 Sep 2024 08:16) (91% - 99%)    Parameters below as of 04 Sep 2024 13:10  Patient On (Oxygen Delivery Method): room air    GENERAL:     age appropriate, in NAD  HEAD:     atraumatic, normocephalic  EYES:     EOMI, conjunctiva and sclera clear  RESPIRATORY:     clear to auscultation bilaterally, no rales or rhonchi or wheezing or rubs  CARDIOVASCULAR:     regular rate and rhythm, no murmurs or rubs or gallops  GASTROINTESTINAL:     soft, nontender, nondistended, bowel sounds present  EXTREMITIES:     no clubbing or cyanosis or edema  MUSCULOSKELETAL:     some right knee discomfrt  NERVOUS SYSTEM:     moves both feet, no sensory deficits noted  PSYCH:     appropriate, alert and orientated x3, good concentration      LABS:                        11.1   14.45 )-----------( 165      ( 05 Sep 2024 06:00 )             34.3     05 Sep 2024 06:00    137    |  105    |  16     ----------------------------<  138    4.3     |  28     |  1.04     Ca    9.2        05 Sep 2024 06:00        Urinalysis Basic - ( 05 Sep 2024 06:00 )    Color: x / Appearance: x / SG: x / pH: x  Gluc: 138 mg/dL / Ketone: x  / Bili: x / Urobili: x   Blood: x / Protein: x / Nitrite: x   Leuk Esterase: x / RBC: x / WBC x   Sq Epi: x / Non Sq Epi: x / Bacteria: x      CAPILLARY BLOOD GLUCOSE

## 2024-09-05 NOTE — PHARMACOTHERAPY INTERVENTION NOTE - COMMENTS
Admission medication reconciliation POD1  
Transition of Care video discharge education - medication calendar given to patient  Vishal Tavares, PharmD Candidate for Latosha Yeboah, PharmD

## 2024-09-05 NOTE — PROGRESS NOTE ADULT - SUBJECTIVE AND OBJECTIVE BOX
TWIN RUFFIN                                                               [id number]                                                       Allergies---No Known Allergies        Pt is a 79y year old Female s/p right TKR.   Pt. is A&O x 3, resting comfortably, with no complaints.   Pain is 2/10.   Tolerating the diet.   Denies chest pain / shortness of breath / dyspnea / nausea / vomiting / headaches or light headed ness.         Vital Signs Last 24 Hrs  T(F): 97.5 (09-05-24 @ 08:16), Max: 98.3 (09-04-24 @ 23:28)  HR: 60 (09-05-24 @ 08:16)  BP: 105/68 (09-05-24 @ 08:16)  RR: 17 (09-05-24 @ 08:16)  SpO2: 96% (09-05-24 @ 08:16)    I&O's Detail    04 Sep 2024 07:01  -  05 Sep 2024 07:00  --------------------------------------------------------  IN:    Lactated Ringers: 1200 mL  Total IN: 1200 mL    OUT:    Blood Loss (mL): 150 mL    Voided (mL): 1550 mL  Total OUT: 1700 mL    Total NET: -500 mL              PE:   Right Lower Extremity:   mepilex Dressing is C/D/I.   No redness, swelling, heat, discharge or other evidence of infection, superficial or deep.   Neurovascularly intact.   No gross evidence of motor or sensory deficit.   +2 DP/PT pulses.   EHL/FHL/TA intact.   Toes are pink and mobile.   Capillary refill < 2 seconds.   Negative calf tenderness.   PAS on.                               11.1   14.45 )--------------( 165                          09-05-24 @ 06:00               34.3         137   |  105  |  16  -----------------------------<  138                  09-05-24 @ 06:00  4.3    |  28    |  1.04        Ca    9.2              A:   Pt is a 79y year old Female S/P right total knee replacement, Post Op Day #1        Plan:    - Follow up with Medicine    - OOB with PT/OT   - Pain control    - Incentive spirometry   - Labs in A.M.   - Discharge Planning   - DVT ppx = PAS +  aspirin enteric coated 81 milliGRAM(s) Oral every 12 hours                                                                                                                                                                             Esau BONNER

## 2024-09-05 NOTE — PROGRESS NOTE ADULT - SUBJECTIVE AND OBJECTIVE BOX
Orthopedic Progress Note     Interval History:  No acute events overnight, pain is well controlled.  Patient denies any chest pain, SOB, N/V, fevers/chills.    Exam:  T(C): 36.8 (09-05-24 @ 03:30), Max: 36.8 (09-04-24 @ 13:10)  HR: 61 (09-05-24 @ 03:30) (61 - 98)  BP: 93/52 (09-05-24 @ 03:30) (81/62 - 104/61)  RR: 16 (09-05-24 @ 03:30) (16 - 22)  SpO2: 97% (09-05-24 @ 03:30) (91% - 99%)  Wt(kg): --I&O's Summary    04 Sep 2024 07:01  -  05 Sep 2024 07:00  --------------------------------------------------------  IN: 1200 mL / OUT: 1700 mL / NET: -500 mL        Lying in bed in no apparent distress  Unlabored respirations    EXT:   Dressing clean, dry and intact.   Compression stocking in place.   Sensation is intact in the superficial peroneal, deep peroneal, tibial, sural and saphenous nerve distributions  Able to dorsiflex and plantarflex ankle. Wiggles toes  Foot warm and well perfused  Sequential compression device on            Labs:           Assessment/ Plan: TWIN RUFFIN is postoperative day #1 s/p right total knee replacement. Overall doing well  - please ensure ice over operative site while in bed or chair.   - PT/ OT for rehabilitation. WBAT with ROM activities as tolerated  - multimodal pain regimen.   - IV surgical prophylactic antibiotics x 24 hours.   - chemical DVT prophylaxis per protocol  - SCDs while in bed. Out of bed to chair   - thigh high compression stockings. Keep on operative leg until postoperative appointment. Can remove compression stocking to shower/ bathe.  - incentive spirometry  - disposition planning. Call [phone] to confirm or change postoperative appointment.  - page orthopedics team with questions or concerns.

## 2024-09-06 VITALS
RESPIRATION RATE: 18 BRPM | OXYGEN SATURATION: 98 % | SYSTOLIC BLOOD PRESSURE: 141 MMHG | TEMPERATURE: 98 F | DIASTOLIC BLOOD PRESSURE: 77 MMHG | HEART RATE: 57 BPM

## 2024-09-06 PROCEDURE — 99232 SBSQ HOSP IP/OBS MODERATE 35: CPT

## 2024-09-06 RX ORDER — OXYCODONE HYDROCHLORIDE 5 MG/1
1 TABLET ORAL
Qty: 26 | Refills: 0
Start: 2024-09-06

## 2024-09-06 RX ADMIN — OXYCODONE HYDROCHLORIDE 5 MILLIGRAM(S): 5 TABLET ORAL at 09:50

## 2024-09-06 RX ADMIN — ACETAMINOPHEN 1000 MILLIGRAM(S): 325 TABLET ORAL at 15:21

## 2024-09-06 RX ADMIN — CELECOXIB 200 MILLIGRAM(S): 400 CAPSULE ORAL at 09:00

## 2024-09-06 RX ADMIN — ACETAMINOPHEN 1000 MILLIGRAM(S): 325 TABLET ORAL at 05:51

## 2024-09-06 RX ADMIN — ACETAMINOPHEN 1000 MILLIGRAM(S): 325 TABLET ORAL at 15:24

## 2024-09-06 RX ADMIN — Medication 10 MILLIGRAM(S): at 09:00

## 2024-09-06 RX ADMIN — OXYCODONE HYDROCHLORIDE 5 MILLIGRAM(S): 5 TABLET ORAL at 08:59

## 2024-09-06 RX ADMIN — Medication 81 MILLIGRAM(S): at 05:32

## 2024-09-06 RX ADMIN — Medication 40 MILLIGRAM(S): at 05:32

## 2024-09-06 RX ADMIN — ACETAMINOPHEN 1000 MILLIGRAM(S): 325 TABLET ORAL at 05:32

## 2024-09-06 NOTE — PROGRESS NOTE ADULT - SUBJECTIVE AND OBJECTIVE BOX
Procedure: Right  TKR  POD #: 2 --seen at 8am  S: Pt without complaints. No SOB,CP, N/V. Tolerated Diet well.   Pain comfortable on tylenol and celebrex.  No oxy used at time of exam, but has taken 5 mg since  No BM yet, would like suppository. Mild abdominal discomfort.  Pain Rx:  acetaminophen     Tablet .. 1000 milliGRAM(s) Oral every 8 hours  celecoxib 200 milliGRAM(s) Oral every 12 hours  HYDROmorphone  Injectable 0.5 milliGRAM(s) IV Push every 3 hours PRN  ondansetron Injectable 4 milliGRAM(s) IV Push every 6 hours PRN  oxyCODONE    IR 10 milliGRAM(s) Oral every 3 hours PRN  oxyCODONE    IR 5 milliGRAM(s) Oral every 3 hours PRN    O: General: On exam, No Apparent Distress  Vital Signs Last 24 Hrs  T(C): 36.7 (06 Sep 2024 08:00), Max: 37 (05 Sep 2024 23:15)  T(F): 98 (06 Sep 2024 08:00), Max: 98.6 (05 Sep 2024 23:15)  HR: 57 (06 Sep 2024 08:00) (57 - 72)  BP: 141/77 (06 Sep 2024 08:00) (113/69 - 141/77)  BP(mean): --  RR: 18 (06 Sep 2024 08:00) (17 - 18)  SpO2: 98% (06 Sep 2024 08:00) (96% - 98%)    Parameters below as of 05 Sep 2024 23:15  Patient On (Oxygen Delivery Method): room air        Lungs: BS clear bilat.  Heart: RRR no murmur  Abdomen: + BS, soft , benign exam     Ext -- right knee mepilex clean and dry.   ROM:0-85 sitting in chair  Neurologic:  Has sensation over feet & toes bilat. Full AROM bilat feet & toes. EHL/AT = 5/5  Vascular: Feet toes warm, pink. DP = 2+. No calf tenderness bilat..  VTEP: On Bilat. Venodynes + aspirin enteric coated 81 milliGRAM(s) Oral every 12 hours    I&O's Detail      Activity in PT yesterday Noted. Walked  Labs yesterday noted.  Hospitalist input noted.  Labs Today:                         11.1   14.45 )-----------( 165      ( 05 Sep 2024 06:00 )             34.3       09-05    137  |  105  |  16  ----------------------------<  138<H>  4.3   |  28  |  1.04    Ca    9.2      05 Sep 2024 06:00        Primary Orthopedic Assessment:  • Stable from Orthopedic perspective  • Neuro motor exam stable  • Labs: CBC / Chem stable      Plan:   • Continue:  PT/OT/WBAT with assistance of a walker/Ice to knee/ Knee ROM         Incentive spirometry encouraged   • Continue DVT prophylaxis as prescribed, including use of compression devices and ankle pumps  • Continue Pain Rx  • Plans per Medicine   • Discharge planning – anticipated discharge is Home D/C with home PT --today  Opioid safety discussed w/ pt.  Do not keep opioid in the medicine cabinet in bathroom or on kitchen counter where others may have access to it.  Do not drive while taking opioid.  To dispose of it some pharmacies do have drop boxes as do police stations.  Do not flush or put in trash.

## 2024-09-06 NOTE — PROGRESS NOTE ADULT - SUBJECTIVE AND OBJECTIVE BOX
Patient is a 79y old  Female who presents with a chief complaint of right knee pain--for right TKA (06 Sep 2024 11:44)    INTERVAL HPI/OVERNIGHT EVENTS:  No acute events overnight.  This AM, had some knee pain but manageable.  No numbness or weakness reported.  No nausea.      MEDICATIONS  (STANDING):  acetaminophen     Tablet .. 1000 milliGRAM(s) Oral every 8 hours  aspirin enteric coated 81 milliGRAM(s) Oral every 12 hours  atorvastatin 10 milliGRAM(s) Oral at bedtime  celecoxib 200 milliGRAM(s) Oral every 12 hours  lactated ringers. 1000 milliLiter(s) (100 mL/Hr) IV Continuous <Continuous>  pantoprazole    Tablet 40 milliGRAM(s) Oral before breakfast  polyethylene glycol 3350 17 Gram(s) Oral at bedtime  senna 2 Tablet(s) Oral at bedtime  sodium chloride 0.9% Bolus 500 milliLiter(s) IV Bolus once    MEDICATIONS  (PRN):  HYDROmorphone  Injectable 0.5 milliGRAM(s) IV Push every 3 hours PRN Breakthrough pain  magnesium hydroxide Suspension 30 milliLiter(s) Oral daily PRN Constipation  ondansetron Injectable 4 milliGRAM(s) IV Push every 6 hours PRN Nausea and/or Vomiting  oxyCODONE    IR 10 milliGRAM(s) Oral every 3 hours PRN Severe Pain (7 - 10)  oxyCODONE    IR 5 milliGRAM(s) Oral every 3 hours PRN Moderate Pain (4 - 6)      Allergies  No Known Allergies    ROS as above and reviewed and is otherwise negative      PHYSICAL EXAM:  Vital Signs Last 24 Hrs  T(C): 36.7 (06 Sep 2024 08:00), Max: 37 (05 Sep 2024 23:15)  T(F): 98 (06 Sep 2024 08:00), Max: 98.6 (05 Sep 2024 23:15)  HR: 57 (06 Sep 2024 08:00) (57 - 72)  BP: 141/77 (06 Sep 2024 08:00) (113/69 - 141/77)  BP(mean): --  RR: 18 (06 Sep 2024 08:00) (17 - 18)  SpO2: 98% (06 Sep 2024 08:00) (96% - 98%)    Parameters below as of 05 Sep 2024 23:15  Patient On (Oxygen Delivery Method): room air      GENERAL:     age appropriate, in NAD  HEAD:     atraumatic, normocephalic  EYES:     EOMI, conjunctiva and sclera clear  RESPIRATORY:     clear to auscultation bilaterally, no rales or rhonchi or wheezing or rubs  CARDIOVASCULAR:     regular rate and rhythm, no murmurs or rubs or gallops  GASTROINTESTINAL:     soft, nontender, nondistended, bowel sounds present  EXTREMITIES:     no clubbing or cyanosis or edema  MUSCULOSKELETAL:     no joint pain or swelling or deformities  NERVOUS SYSTEM:     motor strength intact with 5/5 B/L upper and lower extremities, no gross sensory deficits  PSYCH:     appropriate, alert and orientated x3, good concentration      LABS:      Ca    9.2        05 Sep 2024 06:00        Urinalysis Basic - ( 05 Sep 2024 06:00 )    Color: x / Appearance: x / SG: x / pH: x  Gluc: 138 mg/dL / Ketone: x  / Bili: x / Urobili: x   Blood: x / Protein: x / Nitrite: x   Leuk Esterase: x / RBC: x / WBC x   Sq Epi: x / Non Sq Epi: x / Bacteria: x      CAPILLARY BLOOD GLUCOSE           Patient is a 79y old  Female who presents with a chief complaint of right knee pain--for right TKA (06 Sep 2024 11:44)    INTERVAL HPI/OVERNIGHT EVENTS:  No acute events overnight.  This AM, had some knee pain but manageable.  No numbness or weakness reported.  No nausea.      MEDICATIONS  (STANDING):  acetaminophen     Tablet .. 1000 milliGRAM(s) Oral every 8 hours  aspirin enteric coated 81 milliGRAM(s) Oral every 12 hours  atorvastatin 10 milliGRAM(s) Oral at bedtime  celecoxib 200 milliGRAM(s) Oral every 12 hours  lactated ringers. 1000 milliLiter(s) (100 mL/Hr) IV Continuous <Continuous>  pantoprazole    Tablet 40 milliGRAM(s) Oral before breakfast  polyethylene glycol 3350 17 Gram(s) Oral at bedtime  senna 2 Tablet(s) Oral at bedtime  sodium chloride 0.9% Bolus 500 milliLiter(s) IV Bolus once    MEDICATIONS  (PRN):  HYDROmorphone  Injectable 0.5 milliGRAM(s) IV Push every 3 hours PRN Breakthrough pain  magnesium hydroxide Suspension 30 milliLiter(s) Oral daily PRN Constipation  ondansetron Injectable 4 milliGRAM(s) IV Push every 6 hours PRN Nausea and/or Vomiting  oxyCODONE    IR 10 milliGRAM(s) Oral every 3 hours PRN Severe Pain (7 - 10)  oxyCODONE    IR 5 milliGRAM(s) Oral every 3 hours PRN Moderate Pain (4 - 6)      Allergies  No Known Allergies    ROS as above and reviewed and is otherwise negative      PHYSICAL EXAM:  Vital Signs Last 24 Hrs  T(C): 36.7 (06 Sep 2024 08:00), Max: 37 (05 Sep 2024 23:15)  T(F): 98 (06 Sep 2024 08:00), Max: 98.6 (05 Sep 2024 23:15)  HR: 57 (06 Sep 2024 08:00) (57 - 72)  BP: 141/77 (06 Sep 2024 08:00) (113/69 - 141/77)  BP(mean): --  RR: 18 (06 Sep 2024 08:00) (17 - 18)  SpO2: 98% (06 Sep 2024 08:00) (96% - 98%)    Parameters below as of 05 Sep 2024 23:15  Patient On (Oxygen Delivery Method): room air      GENERAL:     age appropriate, in NAD  HEAD:     atraumatic, normocephalic  EYES:     EOMI, conjunctiva and sclera clear  RESPIRATORY:     clear to auscultation bilaterally, no rales or rhonchi or wheezing or rubs  CARDIOVASCULAR:     regular rate and rhythm, no murmurs or rubs or gallops  GASTROINTESTINAL:     soft, nontender, nondistended, bowel sounds present  EXTREMITIES:     no clubbing or cyanosis or edema  MUSCULOSKELETAL:     some right knee discomfrt  NERVOUS SYSTEM:     moves both feet, no sensory deficits noted  PSYCH:     appropriate, alert and orientated x3, good concentration        LABS:      Ca    9.2        05 Sep 2024 06:00        Urinalysis Basic - ( 05 Sep 2024 06:00 )    Color: x / Appearance: x / SG: x / pH: x  Gluc: 138 mg/dL / Ketone: x  / Bili: x / Urobili: x   Blood: x / Protein: x / Nitrite: x   Leuk Esterase: x / RBC: x / WBC x   Sq Epi: x / Non Sq Epi: x / Bacteria: x      CAPILLARY BLOOD GLUCOSE

## 2024-09-06 NOTE — CASE MANAGEMENT PROGRESS NOTE - NSCMPROGRESSNOTE_GEN_ALL_CORE
Patient cleared for discharge home today. Referral sent to [facility] and accepted for SOC 9/7/2024. Patient is agreeable carmina carrillo and her family will be availble to transport patient home and assist as needed at home.

## 2024-09-06 NOTE — PROGRESS NOTE ADULT - ASSESSMENT
79F with HLD, osteoarthritis, overactive bladder, GERD, anemia, hx of COPD, hx of hypothyroidism who presents electively for right knee surgery.     Right knee surgery for osteoarthritis  - medically stable for DC  - post-op care as per ortho  - DVT ppx and pain control as per ortho/anesthesia  - PT/OT  - advance diet as tolerated  - anti-emetics PRN  - incentive spirometer  - bowel regiment    HLD  - cont with atorvastatin    Preventive measures  - as per ortho protocol -> ASA 81mg BID
79F with HLD, osteoarthritis, overactive bladder, GERD, anemia, hx of COPD, hx of hypothyroidism who presents electively for right knee surgery.     Right knee surgery for osteoarthritis  - post-op care as per ortho  - DVT ppx and pain control as per ortho/anesthesia  - PT/OT  - advance diet as tolerated  - anti-emetics PRN  - incentive spirometer  - bowel regiment    HLD  - cont with atorvastatin    Preventive measures  - as per ortho protocol -> ASA 81mg BID

## 2024-09-06 NOTE — PROGRESS NOTE ADULT - REASON FOR ADMISSION
TKA
right knee surgery
Right Knee Arthritis
right knee surgery
right knee pain--for right TKA
right total knee replacement

## 2024-09-16 ENCOUNTER — NON-APPOINTMENT (OUTPATIENT)
Age: 79
End: 2024-09-16

## 2024-09-19 ENCOUNTER — APPOINTMENT (OUTPATIENT)
Dept: ORTHOPEDIC SURGERY | Facility: CLINIC | Age: 79
End: 2024-09-19
Payer: MEDICARE

## 2024-09-19 DIAGNOSIS — Z96.651 PRESENCE OF RIGHT ARTIFICIAL KNEE JOINT: ICD-10-CM

## 2024-09-19 PROCEDURE — 99024 POSTOP FOLLOW-UP VISIT: CPT

## 2024-09-19 PROCEDURE — 73562 X-RAY EXAM OF KNEE 3: CPT | Mod: RT

## 2024-09-19 RX ORDER — OXYCODONE 5 MG/1
5 TABLET ORAL
Qty: 20 | Refills: 0 | Status: ACTIVE | COMMUNITY
Start: 2024-09-19 | End: 1900-01-01

## 2024-09-19 NOTE — DISCUSSION/SUMMARY
[de-identified] : Now s/p right TKA on 9/4/2024 for follow-up. Overall doing well   At this point we have suggested continued exercises and formal physical therapy with Rx given. Appropriate instructions regarding further care, restrictions, and further recovery has been given. They know to complete the prescribed four weeks of DVT prophylaxis.   We have also counseled the patient to avoid any dental procedures for the first three months postoperatively.. We remain available for any further questions and concerns and instructed patient that we would like to see them if any concerns for infection including fevers, redness, or increased swelling.

## 2024-09-19 NOTE — HISTORY OF PRESENT ILLNESS
[de-identified] : TWIN RUFFIN  is an 79 year-old female presents today status post right total knee arthroplasty on 9/4/2024 for followup. The patient is living at home. The patient has maintained weight bearing as tolerated. The patient is ambulating with a cane for assistive device and working with physical therapy. The patient is weaning off all narcotic pain medicine. The patient is progressing well and is happy with the progress.   No fever, chills, or signs of infection. Today, patient does not state any other associated signs or complaints outside of those described. The patient presents for postoperative followup.

## 2024-09-19 NOTE — PHYSICAL EXAM
[de-identified] : General Appearance / Station: Well developed, well nourished, in no acute distress Orientation: Oriented to person, place, and time Gait & Station: Ambulates with assistive device Neurologic: Normal leg sensation Cardiovascular: Warm extremity Lymphatics: No lymphedema   OPERATIVE KNEE Skin: incision clean, dry and intact. Well healing. Steri strips intact. Wound cleaned with chloraprep and new steri strips applied No erythema, warmth or tenderness. Range of motion: 0-100 Strength: Within Normal Limits. Able to straight leg raise                                                                  Neurovascular Exam: Able to wiggle toes and dorsiflex/ plantarflex ankle. Foot warm, well perfused       [de-identified] : Imaging: Three views of the right knee show satisfactory placement of a right cemented total knee arthroplasty. There are no changes in alignment of hardware and no signs of radiographic failure compared to previous radiographs.

## 2024-10-15 PROCEDURE — 97161 PT EVAL LOW COMPLEX 20 MIN: CPT

## 2024-10-15 PROCEDURE — C1713: CPT

## 2024-10-15 PROCEDURE — 97530 THERAPEUTIC ACTIVITIES: CPT

## 2024-10-15 PROCEDURE — 97110 THERAPEUTIC EXERCISES: CPT

## 2024-10-15 PROCEDURE — C1776: CPT

## 2024-10-15 PROCEDURE — 97165 OT EVAL LOW COMPLEX 30 MIN: CPT

## 2024-10-15 PROCEDURE — 80048 BASIC METABOLIC PNL TOTAL CA: CPT

## 2024-10-15 PROCEDURE — 73560 X-RAY EXAM OF KNEE 1 OR 2: CPT

## 2024-10-15 PROCEDURE — C1889: CPT

## 2024-10-15 PROCEDURE — 97116 GAIT TRAINING THERAPY: CPT

## 2024-10-15 PROCEDURE — 97535 SELF CARE MNGMENT TRAINING: CPT

## 2024-10-15 PROCEDURE — 36415 COLL VENOUS BLD VENIPUNCTURE: CPT

## 2024-10-15 PROCEDURE — S2900: CPT

## 2024-10-15 PROCEDURE — 85027 COMPLETE CBC AUTOMATED: CPT

## 2024-10-31 PROBLEM — K21.9 GASTRO-ESOPHAGEAL REFLUX DISEASE WITHOUT ESOPHAGITIS: Chronic | Status: ACTIVE | Noted: 2024-09-04

## 2024-10-31 PROBLEM — E78.5 HYPERLIPIDEMIA, UNSPECIFIED: Chronic | Status: ACTIVE | Noted: 2024-09-04

## 2024-11-05 ENCOUNTER — APPOINTMENT (OUTPATIENT)
Dept: ORTHOPEDIC SURGERY | Facility: CLINIC | Age: 79
End: 2024-11-05
Payer: MEDICARE

## 2024-11-05 PROCEDURE — 99024 POSTOP FOLLOW-UP VISIT: CPT

## 2024-11-05 PROCEDURE — 73562 X-RAY EXAM OF KNEE 3: CPT | Mod: RT

## 2024-11-05 RX ORDER — DICLOFENAC SODIUM 20 MG/G
2 SOLUTION TOPICAL
Qty: 1 | Refills: 4 | Status: ACTIVE | COMMUNITY
Start: 2024-11-05 | End: 1900-01-01

## 2024-11-05 RX ORDER — LIDOCAINE 5% 700 MG/1
5 PATCH TOPICAL
Qty: 30 | Refills: 1 | Status: ACTIVE | COMMUNITY
Start: 2024-11-05 | End: 1900-01-01

## 2024-11-18 ENCOUNTER — NON-APPOINTMENT (OUTPATIENT)
Age: 79
End: 2024-11-18

## 2025-03-03 ENCOUNTER — OUTPATIENT (OUTPATIENT)
Dept: OUTPATIENT SERVICES | Facility: HOSPITAL | Age: 80
LOS: 1 days | End: 2025-03-03
Payer: MEDICARE

## 2025-03-03 VITALS
SYSTOLIC BLOOD PRESSURE: 135 MMHG | HEIGHT: 60.63 IN | WEIGHT: 154.98 LBS | DIASTOLIC BLOOD PRESSURE: 86 MMHG | HEART RATE: 67 BPM | TEMPERATURE: 98 F | OXYGEN SATURATION: 99 % | RESPIRATION RATE: 18 BRPM

## 2025-03-03 DIAGNOSIS — M20.42 OTHER HAMMER TOE(S) (ACQUIRED), LEFT FOOT: ICD-10-CM

## 2025-03-03 DIAGNOSIS — Z96.651 PRESENCE OF RIGHT ARTIFICIAL KNEE JOINT: Chronic | ICD-10-CM

## 2025-03-03 LAB
ANION GAP SERPL CALC-SCNC: 11 MMOL/L — SIGNIFICANT CHANGE UP (ref 5–17)
BUN SERPL-MCNC: 14 MG/DL — SIGNIFICANT CHANGE UP (ref 7–23)
CALCIUM SERPL-MCNC: 9.8 MG/DL — SIGNIFICANT CHANGE UP (ref 8.4–10.5)
CHLORIDE SERPL-SCNC: 100 MMOL/L — SIGNIFICANT CHANGE UP (ref 96–108)
CO2 SERPL-SCNC: 27 MMOL/L — SIGNIFICANT CHANGE UP (ref 22–31)
CREAT SERPL-MCNC: 0.81 MG/DL — SIGNIFICANT CHANGE UP (ref 0.5–1.3)
EGFR: 72 ML/MIN/1.73M2 — SIGNIFICANT CHANGE UP
EGFR: 72 ML/MIN/1.73M2 — SIGNIFICANT CHANGE UP
GLUCOSE SERPL-MCNC: 121 MG/DL — HIGH (ref 70–99)
HCT VFR BLD CALC: 43.3 % — SIGNIFICANT CHANGE UP (ref 34.5–45)
HGB BLD-MCNC: 13.8 G/DL — SIGNIFICANT CHANGE UP (ref 11.5–15.5)
MCHC RBC-ENTMCNC: 28.3 PG — SIGNIFICANT CHANGE UP (ref 27–34)
MCHC RBC-ENTMCNC: 31.9 G/DL — LOW (ref 32–36)
MCV RBC AUTO: 88.9 FL — SIGNIFICANT CHANGE UP (ref 80–100)
NRBC BLD AUTO-RTO: 0 /100 WBCS — SIGNIFICANT CHANGE UP (ref 0–0)
PLATELET # BLD AUTO: 233 K/UL — SIGNIFICANT CHANGE UP (ref 150–400)
POTASSIUM SERPL-MCNC: 4.1 MMOL/L — SIGNIFICANT CHANGE UP (ref 3.5–5.3)
POTASSIUM SERPL-SCNC: 4.1 MMOL/L — SIGNIFICANT CHANGE UP (ref 3.5–5.3)
RBC # BLD: 4.87 M/UL — SIGNIFICANT CHANGE UP (ref 3.8–5.2)
RBC # FLD: 14.9 % — HIGH (ref 10.3–14.5)
SODIUM SERPL-SCNC: 138 MMOL/L — SIGNIFICANT CHANGE UP (ref 135–145)
WBC # BLD: 7.11 K/UL — SIGNIFICANT CHANGE UP (ref 3.8–10.5)
WBC # FLD AUTO: 7.11 K/UL — SIGNIFICANT CHANGE UP (ref 3.8–10.5)

## 2025-03-03 PROCEDURE — 85027 COMPLETE CBC AUTOMATED: CPT

## 2025-03-03 PROCEDURE — 80048 BASIC METABOLIC PNL TOTAL CA: CPT

## 2025-03-03 PROCEDURE — G0463: CPT

## 2025-03-03 RX ORDER — LIDOCAINE HCL/PF 10 MG/ML
0.2 VIAL (ML) INJECTION ONCE
Refills: 0 | Status: DISCONTINUED | OUTPATIENT
Start: 2025-03-04 | End: 2025-03-18

## 2025-03-03 RX ORDER — SODIUM CHLORIDE 9 G/1000ML
1000 INJECTION, SOLUTION INTRAVENOUS
Refills: 0 | Status: DISCONTINUED | OUTPATIENT
Start: 2025-03-04 | End: 2025-03-18

## 2025-03-03 NOTE — H&P PST ADULT - PROBLEM SELECTOR PLAN 1
CBC BMP in PST   instructions provided, soap reviewed and diet reviewed  all questions answered during exam

## 2025-03-03 NOTE — H&P PST ADULT - ASSESSMENT
Airway:  normal    Mallampati-     2  Dental: lower bridge teeth cemented in   5.99 bike riding daily, cleaning house

## 2025-03-03 NOTE — H&P PST ADULT - NSICDXPASTMEDICALHX_GEN_ALL_CORE_FT
PAST MEDICAL HISTORY:  Dyslipidemia     GERD (gastroesophageal reflux disease)     Osteoarthritis of right knee     Overactive bladder

## 2025-03-04 ENCOUNTER — TRANSCRIPTION ENCOUNTER (OUTPATIENT)
Age: 80
End: 2025-03-04

## 2025-03-04 ENCOUNTER — OUTPATIENT (OUTPATIENT)
Dept: OUTPATIENT SERVICES | Facility: HOSPITAL | Age: 80
LOS: 1 days | End: 2025-03-04
Payer: MEDICARE

## 2025-03-04 ENCOUNTER — RESULT REVIEW (OUTPATIENT)
Age: 80
End: 2025-03-04

## 2025-03-04 VITALS
TEMPERATURE: 98 F | SYSTOLIC BLOOD PRESSURE: 144 MMHG | OXYGEN SATURATION: 97 % | RESPIRATION RATE: 18 BRPM | HEART RATE: 65 BPM | DIASTOLIC BLOOD PRESSURE: 71 MMHG

## 2025-03-04 VITALS
WEIGHT: 154.98 LBS | HEIGHT: 60.63 IN | TEMPERATURE: 98 F | SYSTOLIC BLOOD PRESSURE: 112 MMHG | RESPIRATION RATE: 18 BRPM | OXYGEN SATURATION: 97 % | DIASTOLIC BLOOD PRESSURE: 74 MMHG | HEART RATE: 67 BPM

## 2025-03-04 DIAGNOSIS — M20.42 OTHER HAMMER TOE(S) (ACQUIRED), LEFT FOOT: ICD-10-CM

## 2025-03-04 DIAGNOSIS — Z96.651 PRESENCE OF RIGHT ARTIFICIAL KNEE JOINT: Chronic | ICD-10-CM

## 2025-03-04 PROCEDURE — 76000 FLUOROSCOPY <1 HR PHYS/QHP: CPT

## 2025-03-04 PROCEDURE — 73630 X-RAY EXAM OF FOOT: CPT | Mod: 26,LT

## 2025-03-04 PROCEDURE — 73630 X-RAY EXAM OF FOOT: CPT

## 2025-03-04 PROCEDURE — 28285 REPAIR OF HAMMERTOE: CPT | Mod: T1

## 2025-03-04 PROCEDURE — C1713: CPT

## 2025-03-04 DEVICE — KWIRE 1.1MMX15.2CM: Type: IMPLANTABLE DEVICE | Site: LEFT | Status: FUNCTIONAL

## 2025-03-04 RX ORDER — FENTANYL CITRATE-0.9 % NACL/PF 100MCG/2ML
25 SYRINGE (ML) INTRAVENOUS
Refills: 0 | Status: DISCONTINUED | OUTPATIENT
Start: 2025-03-04 | End: 2025-03-04

## 2025-03-04 RX ORDER — ONDANSETRON HCL/PF 4 MG/2 ML
4 VIAL (ML) INJECTION ONCE
Refills: 0 | Status: DISCONTINUED | OUTPATIENT
Start: 2025-03-04 | End: 2025-03-18

## 2025-03-04 RX ORDER — CEFAZOLIN SODIUM IN 0.9 % NACL 3 G/100 ML
2000 INTRAVENOUS SOLUTION, PIGGYBACK (ML) INTRAVENOUS ONCE
Refills: 0 | Status: COMPLETED | OUTPATIENT
Start: 2025-03-04 | End: 2025-03-04

## 2025-03-04 RX ADMIN — SODIUM CHLORIDE 100 MILLILITER(S): 9 INJECTION, SOLUTION INTRAVENOUS at 08:59

## 2025-03-04 NOTE — ASU PATIENT PROFILE, ADULT - FALL HARM RISK - UNIVERSAL INTERVENTIONS
Bed in lowest position, wheels locked, appropriate side rails in place/Call bell, personal items and telephone in reach/Instruct patient to call for assistance before getting out of bed or chair/Non-slip footwear when patient is out of bed/[place] to call system/Physically safe environment - no spills, clutter or unnecessary equipment/Purposeful Proactive Rounding/Room/bathroom lighting operational, light cord in reach

## 2025-03-04 NOTE — PRE-ANESTHESIA EVALUATION ADULT - NSPROPOSEDPROCEDFT_GEN_ALL_CORE
Goal Outcome Evaluation:  Plan of Care Reviewed With: patient     Outcome Summary: SLP approved pt for diet today and pt worked with PT. Pt reporting generalized (chronic) pain, ultram added. Grady removed today, pt has had many loose, mucoid BM. Decreased appetite and lethargic throughout day. Potassium replaced, recheck at 2100. Continue zosyn and vanc. Awaiting discharge back to facility   left second toe hammertoe correction

## 2025-03-04 NOTE — ASU PATIENT PROFILE, ADULT - FALL HARM RISK - CONCLUSION
July 30, 2024      [facility]  [address] [facility]  [place] [address]  Phone: [phone]  Fax: [phone]       Patient: Katelyn Pritchard   YOB: 1996  Date of Visit: 07/30/2024    To Whom It May Concern:    Da Pritchard  is currently under our care due to pregnancy,with an EDC of 11/18/2024.   If you have any questions or concerns, or if I can be of further assistance, please do not hesitate to contact me.    Sincerely,     Milena Nicholas CNM / Terri FULLER,OB/GYN      
Universal Safety Interventions

## 2025-03-04 NOTE — ASU DISCHARGE PLAN (ADULT/PEDIATRIC) - FINANCIAL ASSISTANCE
[facility] provides services at a reduced cost to those who are determined to be eligible through [facility]’s financial assistance program. Information regarding [facility]’s financial assistance program can be found by going to [email or web address] or by calling [phone].

## 2025-03-04 NOTE — ASU DISCHARGE PLAN (ADULT/PEDIATRIC) - CARE PROVIDER_API CALL
Allen Reddy  Podiatric Medicine and Surgery  [address] [place] [address]  Phone: [phone]  Fax: [phone]  Follow Up Time:

## 2025-05-01 ENCOUNTER — APPOINTMENT (OUTPATIENT)
Dept: ORTHOPEDIC SURGERY | Facility: CLINIC | Age: 80
End: 2025-05-01

## 2025-05-01 PROCEDURE — 99214 OFFICE O/P EST MOD 30 MIN: CPT

## 2025-05-01 PROCEDURE — 73562 X-RAY EXAM OF KNEE 3: CPT | Mod: 50

## 2025-08-18 ENCOUNTER — APPOINTMENT (OUTPATIENT)
Dept: ORTHOPEDIC SURGERY | Facility: CLINIC | Age: 80
End: 2025-08-18
Payer: MEDICARE

## 2025-08-18 DIAGNOSIS — G56.03 CARPAL TUNNEL SYNDROM,BILATERAL UPPER LIMBS: ICD-10-CM

## 2025-08-18 DIAGNOSIS — M72.0 PALMAR FASCIAL FIBROMATOSIS [DUPUYTREN]: ICD-10-CM

## 2025-08-18 PROCEDURE — 99213 OFFICE O/P EST LOW 20 MIN: CPT

## 2025-08-28 ENCOUNTER — APPOINTMENT (OUTPATIENT)
Dept: ORTHOPEDIC SURGERY | Facility: CLINIC | Age: 80
End: 2025-08-28

## 2025-08-28 DIAGNOSIS — M17.12 UNILATERAL PRIMARY OSTEOARTHRITIS, LEFT KNEE: ICD-10-CM

## 2025-08-28 PROCEDURE — 20610 DRAIN/INJ JOINT/BURSA W/O US: CPT | Mod: LT

## 2025-08-28 PROCEDURE — 99213 OFFICE O/P EST LOW 20 MIN: CPT | Mod: 25

## 2025-08-28 RX ORDER — DICLOFENAC SODIUM 20 MG/G
2 SOLUTION TOPICAL
Qty: 1 | Refills: 1 | Status: ACTIVE | COMMUNITY
Start: 2025-08-28 | End: 1900-01-01

## 2025-08-28 RX ORDER — LIDOCAINE HYDROCHLORIDE 10 MG/ML
1 INJECTION, SOLUTION INFILTRATION; PERINEURAL
Refills: 0 | Status: COMPLETED | OUTPATIENT
Start: 2025-08-28

## 2025-08-28 RX ORDER — METHYLPRED ACET/NACL,ISO-OS/PF 40 MG/ML
40 VIAL (ML) INJECTION
Refills: 0 | Status: COMPLETED | OUTPATIENT
Start: 2025-08-28

## 2025-08-28 RX ADMIN — METHYLPREDNISOLONE ACETATE 2 MG/ML: 40 INJECTION, SUSPENSION INTRA-ARTICULAR; INTRALESIONAL; INTRAMUSCULAR; SOFT TISSUE at 00:00

## 2025-08-28 RX ADMIN — LIDOCAINE HYDROCHLORIDE 4 %: 10 INJECTION, SOLUTION INFILTRATION; PERINEURAL at 00:00

## (undated) DEVICE — TOURNIQUET CUFF 18" DUAL PORT DUAL BLADDER W PLC (BLACK)

## (undated) DEVICE — CRYO/CUFF GRAVITY COOLER KNEE LARGE

## (undated) DEVICE — ELCTR STRYKER NEPTUNE SMOKE EVACUATION PENCIL (GREEN)

## (undated) DEVICE — SOL IRR BAG NS 0.9% 3000ML

## (undated) DEVICE — SOLIDIFIER CANN EXPRESS 3K

## (undated) DEVICE — MAKO BLADE STANDARD

## (undated) DEVICE — SAW BLADE STRYKER MED NARROW 18X5.5MM

## (undated) DEVICE — DRAPE MAYO STAND 23"

## (undated) DEVICE — DRSG ACE BANDAGE 4"

## (undated) DEVICE — SHOE  POSTOP SOFTIE DARCO M-M

## (undated) DEVICE — DRSG STERISTRIPS 0.5 X 4"

## (undated) DEVICE — TOURNIQUET CUFF 34" DUAL PORT W PLC

## (undated) DEVICE — DRAPE TOWEL BLUE 17" X 24"

## (undated) DEVICE — DRAPE 3/4 SHEET 52X76"

## (undated) DEVICE — VENODYNE/SCD SLEEVE CALF MEDIUM

## (undated) DEVICE — BUR STRYKER EGG 4MM

## (undated) DEVICE — HANDPIECE INTERPULSE W MULTI TIP

## (undated) DEVICE — POSITIONER FOAM EGG CRATE ULNAR 2PCS (PINK)

## (undated) DEVICE — PACK EXTREMITY

## (undated) DEVICE — DRAPE INSTRUMENT POUCH 6.75" X 11"

## (undated) DEVICE — SUT POLYSORB 1 36" GS-11 UNDYED

## (undated) DEVICE — DRSG MEPILEX 10 X 30CM (4 X 12") POST OP AG SILVER

## (undated) DEVICE — SOL IRR POUR H2O 500ML

## (undated) DEVICE — DRSG KLING 4"

## (undated) DEVICE — MAKO CHECKPOINT KIT FEMORAL / TIBIAL

## (undated) DEVICE — SUT MONOSOF 3-0 30" C-16

## (undated) DEVICE — HOOD FLYTE STRYKER HELMET SHIELD

## (undated) DEVICE — TOURNIQUET ESMARK 4"

## (undated) DEVICE — BLADE SCALPEL SAFETYLOCK #15

## (undated) DEVICE — SUT POLYSORB 2-0 30" V-20 UNDYED

## (undated) DEVICE — SPECIMEN CONTAINER 100ML

## (undated) DEVICE — DRSG WEBRIL 6"

## (undated) DEVICE — BAG SPONGE COUNTER EZ

## (undated) DEVICE — SOL IRR POUR NS 0.9% 500ML

## (undated) DEVICE — POSITIONER STRAP ARMBOARD VELCRO TS-30

## (undated) DEVICE — DRSG DERMABOND PRINEO 60CM

## (undated) DEVICE — DRSG ADAPTIC 3 X 3"

## (undated) DEVICE — SAW BLADE STRYKER MED AGGRESSIVE 9X25X0.38MM

## (undated) DEVICE — YELLOW PIN COVER

## (undated) DEVICE — MAKO DRAPE KIT

## (undated) DEVICE — SAW BLADE STRYKER SAGITTAL OSCILLATING 7X15X0.38MM

## (undated) DEVICE — SUT POLYSORB 2-0 30" GS-11 UNDYED

## (undated) DEVICE — SUT MONOCRYL 5-0 18" P-3 UNDYED

## (undated) DEVICE — WARMING BLANKET UPPER ADULT

## (undated) DEVICE — DRAPE 1/2 SHEET 40X57"

## (undated) DEVICE — DRSG STOCKINETTE CLOTH 6 X 72"

## (undated) DEVICE — PACK TOTAL KNEE

## (undated) DEVICE — MAKO VIZADISC KNEE TRACKING KIT

## (undated) DEVICE — SUT MONOCRYL 2-0 36" CT-1 UNDYED

## (undated) DEVICE — SUT QUILL PDO 2 36CM 48MM

## (undated) DEVICE — SHOE  POSTOP SOFTIE DARCO M-SM

## (undated) DEVICE — SUT STRATAFIX SPIRAL MONOCRYL PLUS 3-0 30CM PS-2 UNDYED

## (undated) DEVICE — POSITIONER STIRRUP STRAP W SLIP RING 19X3.5"

## (undated) DEVICE — SUT MONOSOF 4-0 18" C-13

## (undated) DEVICE — POSITIONER STRYKER PATIENT PROTECTIVE PAD FOR KNEE

## (undated) DEVICE — SPECIMEN CONTAINER PET

## (undated) DEVICE — SUT POLYSORB 3-0 18" P-12 UNDYED

## (undated) DEVICE — SHOE  POSTOP SOFTIE DARCO M-LG

## (undated) DEVICE — SUT POLYSORB 1 27" GS-12 UNDYED

## (undated) DEVICE — SOL IRR POUR H2O 1500ML